# Patient Record
Sex: FEMALE | Race: WHITE | Employment: OTHER | URBAN - METROPOLITAN AREA
[De-identification: names, ages, dates, MRNs, and addresses within clinical notes are randomized per-mention and may not be internally consistent; named-entity substitution may affect disease eponyms.]

---

## 2017-07-10 ENCOUNTER — HOSPITAL ENCOUNTER (EMERGENCY)
Age: 47
Discharge: HOME | End: 2017-07-10
Attending: EMERGENCY MEDICINE
Payer: MEDICARE

## 2017-07-10 VITALS
TEMPERATURE: 98 F | HEIGHT: 67 IN | BODY MASS INDEX: 32.87 KG/M2 | WEIGHT: 209.44 LBS | DIASTOLIC BLOOD PRESSURE: 80 MMHG | OXYGEN SATURATION: 97 % | HEART RATE: 69 BPM | SYSTOLIC BLOOD PRESSURE: 143 MMHG | RESPIRATION RATE: 16 BRPM

## 2017-07-10 DIAGNOSIS — R51.9 ACUTE NONINTRACTABLE HEADACHE, UNSPECIFIED HEADACHE TYPE: Primary | ICD-10-CM

## 2017-07-10 PROCEDURE — 96375 TX/PRO/DX INJ NEW DRUG ADDON: CPT

## 2017-07-10 PROCEDURE — 96376 TX/PRO/DX INJ SAME DRUG ADON: CPT

## 2017-07-10 PROCEDURE — 74011250636 HC RX REV CODE- 250/636: Performed by: EMERGENCY MEDICINE

## 2017-07-10 PROCEDURE — 96374 THER/PROPH/DIAG INJ IV PUSH: CPT

## 2017-07-10 PROCEDURE — 96361 HYDRATE IV INFUSION ADD-ON: CPT

## 2017-07-10 PROCEDURE — 99284 EMERGENCY DEPT VISIT MOD MDM: CPT

## 2017-07-10 RX ORDER — FLUTICASONE PROPIONATE 50 MCG
2 SPRAY, SUSPENSION (ML) NASAL
Status: DISCONTINUED | OUTPATIENT
Start: 2017-07-10 | End: 2017-07-10 | Stop reason: RX

## 2017-07-10 RX ORDER — DEXTROMETHORPHAN HYDROBROMIDE, GUAIFENESIN 5; 100 MG/5ML; MG/5ML
1300 LIQUID ORAL
COMMUNITY
End: 2018-09-22

## 2017-07-10 RX ORDER — PROCHLORPERAZINE EDISYLATE 5 MG/ML
10 INJECTION INTRAMUSCULAR; INTRAVENOUS
Status: COMPLETED | OUTPATIENT
Start: 2017-07-10 | End: 2017-07-10

## 2017-07-10 RX ORDER — BISMUTH SUBSALICYLATE 262 MG
1 TABLET,CHEWABLE ORAL DAILY
COMMUNITY
End: 2018-09-22

## 2017-07-10 RX ORDER — DEXAMETHASONE SODIUM PHOSPHATE 10 MG/ML
10 INJECTION INTRAMUSCULAR; INTRAVENOUS
Status: COMPLETED | OUTPATIENT
Start: 2017-07-10 | End: 2017-07-10

## 2017-07-10 RX ORDER — DIPHENHYDRAMINE HYDROCHLORIDE 50 MG/ML
50 INJECTION, SOLUTION INTRAMUSCULAR; INTRAVENOUS
Status: COMPLETED | OUTPATIENT
Start: 2017-07-10 | End: 2017-07-10

## 2017-07-10 RX ORDER — ARIPIPRAZOLE 5 MG/1
10 TABLET ORAL DAILY
COMMUNITY

## 2017-07-10 RX ORDER — KETOROLAC TROMETHAMINE 30 MG/ML
30 INJECTION, SOLUTION INTRAMUSCULAR; INTRAVENOUS
Status: COMPLETED | OUTPATIENT
Start: 2017-07-10 | End: 2017-07-10

## 2017-07-10 RX ORDER — MINERAL OIL
180 ENEMA (ML) RECTAL DAILY
Qty: 14 TAB | Refills: 0 | Status: SHIPPED | OUTPATIENT
Start: 2017-07-10 | End: 2017-07-24

## 2017-07-10 RX ORDER — DIPHENHYDRAMINE HYDROCHLORIDE 50 MG/ML
25 INJECTION, SOLUTION INTRAMUSCULAR; INTRAVENOUS
Status: COMPLETED | OUTPATIENT
Start: 2017-07-10 | End: 2017-07-10

## 2017-07-10 RX ORDER — LANOLIN ALCOHOL/MO/W.PET/CERES
2000 CREAM (GRAM) TOPICAL DAILY
COMMUNITY
End: 2018-09-22

## 2017-07-10 RX ORDER — FLUTICASONE PROPIONATE 50 MCG
2 SPRAY, SUSPENSION (ML) NASAL DAILY
Qty: 1 BOTTLE | Refills: 0 | Status: SHIPPED | OUTPATIENT
Start: 2017-07-10 | End: 2017-10-31

## 2017-07-10 RX ADMIN — DIPHENHYDRAMINE HYDROCHLORIDE 50 MG: 50 INJECTION, SOLUTION INTRAMUSCULAR; INTRAVENOUS at 10:39

## 2017-07-10 RX ADMIN — SODIUM CHLORIDE 1000 ML: 900 INJECTION, SOLUTION INTRAVENOUS at 10:40

## 2017-07-10 RX ADMIN — DIPHENHYDRAMINE HYDROCHLORIDE 25 MG: 50 INJECTION, SOLUTION INTRAMUSCULAR; INTRAVENOUS at 11:55

## 2017-07-10 RX ADMIN — DEXAMETHASONE SODIUM PHOSPHATE 10 MG: 10 INJECTION, SOLUTION INTRAMUSCULAR; INTRAVENOUS at 10:45

## 2017-07-10 RX ADMIN — PROCHLORPERAZINE EDISYLATE 10 MG: 5 INJECTION INTRAMUSCULAR; INTRAVENOUS at 11:53

## 2017-07-10 RX ADMIN — KETOROLAC TROMETHAMINE 30 MG: 30 INJECTION, SOLUTION INTRAMUSCULAR at 10:43

## 2017-07-10 RX ADMIN — SODIUM CHLORIDE 1000 ML: 900 INJECTION, SOLUTION INTRAVENOUS at 11:52

## 2017-07-10 RX ADMIN — PROCHLORPERAZINE EDISYLATE 10 MG: 5 INJECTION INTRAMUSCULAR; INTRAVENOUS at 10:38

## 2017-07-10 NOTE — ED NOTES
Pt given discharge instructions by Dr Joseline Montana she verbalizes an understanding, pt stable at time of discharge ambulates to lobby with friend

## 2017-07-10 NOTE — ED TRIAGE NOTES
Pt ambulates to treatment area with friend she states that for the past 3 days she has had a sinus headache that is now causing her a lot of stress and making her headache worse. She has had N/V with the headache but no dizziness. She continues to have sinus congestion on the right side as well.   She has been taking Tylenol and took Nyquil last night but it did not help

## 2017-07-10 NOTE — ED NOTES
Pt repositioned with lights dimmed and warm blanket provided for comfort. Pt states that she is feeling less stressed with medication but her pain is still there.   IV fluids infusing pt's friend at the bedside

## 2017-07-10 NOTE — ED PROVIDER NOTES
Patient is a 48 y.o. female presenting with headaches. The history is provided by the patient. Headache    This is a recurrent problem. Episode onset: 3 days. The problem occurs constantly. The problem has been gradually worsening. The headache is aggravated by photophobia (sinus congestion). The pain is located in the right unilateral region. The quality of the pain is described as throbbing. The pain is at a severity of 10/10. Pain severity now: similiar to prior headaches. Associated symptoms include nausea and vomiting. Pertinent negatives include no fever, no chest pressure, no shortness of breath, no weakness, no tingling, no dizziness and no visual change. She has tried nothing for the symptoms. Past Medical History:   Diagnosis Date    Asthma     Bronchitis     Fibromyalgia     Seasonal allergic reaction        Past Surgical History:   Procedure Laterality Date    HX APPENDECTOMY      HX GASTRIC BYPASS      HX GYN      HX ORTHOPAEDIC      lumbar fusion    HX TUBAL LIGATION           History reviewed. No pertinent family history. Social History     Social History    Marital status:      Spouse name: N/A    Number of children: N/A    Years of education: N/A     Occupational History    Not on file. Social History Main Topics    Smoking status: Former Smoker    Smokeless tobacco: Never Used    Alcohol use No    Drug use: No    Sexual activity: Not on file     Other Topics Concern    Not on file     Social History Narrative         ALLERGIES: Lortab [hydrocodone-acetaminophen] and Percocet [oxycodone-acetaminophen]    Review of Systems   Constitutional: Negative for chills and fever. HENT: Negative for ear pain and sore throat. Eyes: Negative for pain. Respiratory: Negative for chest tightness and shortness of breath. Cardiovascular: Negative for chest pain and leg swelling. Gastrointestinal: Positive for nausea and vomiting. Negative for abdominal pain. Genitourinary: Negative for dysuria and flank pain. Musculoskeletal: Negative for back pain. Skin: Negative for rash. Neurological: Positive for headaches. Negative for dizziness, tingling and weakness. All other systems reviewed and are negative. Vitals:    07/10/17 1006   BP: 151/83   Pulse: 80   Resp: 16   Temp: 98.2 °F (36.8 °C)   SpO2: 97%   Weight: 95 kg (209 lb 7 oz)   Height: 5' 6.75\" (1.695 m)            Physical Exam   Constitutional: She is oriented to person, place, and time. She appears well-developed and well-nourished. HENT:   Head: Normocephalic and atraumatic. Mouth/Throat: Oropharynx is clear and moist.   Eyes: Conjunctivae and EOM are normal. Pupils are equal, round, and reactive to light. No scleral icterus. Neck: Neck supple. No tracheal deviation present. Cardiovascular: Normal rate, regular rhythm, normal heart sounds and intact distal pulses. Pulmonary/Chest: Effort normal and breath sounds normal. No respiratory distress. Abdominal: Soft. She exhibits no distension. There is no tenderness. There is no rebound and no guarding. Genitourinary:   Genitourinary Comments: deferred   Musculoskeletal: She exhibits no edema. Neurological: She is alert and oriented to person, place, and time. No cranial nerve deficit. She exhibits normal muscle tone. Coordination normal.   Motor and sensation intact throughout. No pronator drift, negative Romberg. Skin: Skin is warm and dry. Psychiatric: She has a normal mood and affect. Nursing note and vitals reviewed. MDM  Number of Diagnoses or Management Options  Diagnosis management comments: 48year old female with h/o headaches and fibromyalgia presents with similar HA similar to prior. Out of her allegra and flonase. She thinks allergies precipitated this. Plan to treat with migraine cocktail and re-assess.     ED Course     12:34 PM Patient headache resolved    Procedures      12:34 PM  The patient has been reevaluated. The patient is ready for discharge. The patient's signs, symptoms, diagnosis, and discharge instructions have been discussed and the patient/ family has conveyed their understanding. The patient is to follow up as recommended or return to the ED should their symptoms worsen. Plan has been discussed and the patient is in agreement. LABORATORY TESTS:  No results found for this or any previous visit (from the past 12 hour(s)). IMAGING RESULTS:  No orders to display     No results found. MEDICATIONS GIVEN:  Medications   sodium chloride 0.9 % bolus infusion 1,000 mL (1,000 mL IntraVENous New Bag 7/10/17 1152)   sodium chloride 0.9 % bolus infusion 1,000 mL (0 mL IntraVENous IV Completed 7/10/17 1126)   dexamethasone (PF) (DECADRON) injection 10 mg (10 mg IntraVENous Given 7/10/17 1045)   diphenhydrAMINE (BENADRYL) injection 50 mg (50 mg IntraVENous Given 7/10/17 1039)   ketorolac (TORADOL) injection 30 mg (30 mg IntraVENous Given 7/10/17 1043)   prochlorperazine (COMPAZINE) injection 10 mg (10 mg IntraVENous Given 7/10/17 1038)   prochlorperazine (COMPAZINE) injection 10 mg (10 mg IntraVENous Given 7/10/17 1153)   diphenhydrAMINE (BENADRYL) injection 25 mg (25 mg IntraVENous Given 7/10/17 1155)       IMPRESSION:  1. Acute nonintractable headache, unspecified headache type        PLAN:  1. Current Discharge Medication List      START taking these medications    Details   fluticasone (FLONASE) 50 mcg/actuation nasal spray 2 Sprays by Both Nostrils route daily. Qty: 1 Bottle, Refills: 0      fexofenadine (ALLEGRA ALLERGY) 180 mg tablet Take 1 Tab by mouth daily for 14 days. Qty: 14 Tab, Refills: 0           2.    Follow-up Information     Follow up With Details Comments 130 Adin Capellan MD Call  [facility] [address] [facility] [address] [phone]      [facility] EMERGENCY DEPT   [place] [address]  [address] [id number] [phone]    [address] [place] [address] DEPT  If symptoms worsen [place] [address]  [phone]            Return to ED for new or worsening symptoms       Emperatriz Bruce.  Curry Burgess MD

## 2017-07-10 NOTE — DISCHARGE INSTRUCTIONS
Headache: Care Instructions  Your Care Instructions    Headaches have many possible causes. Most headaches aren't a sign of a more serious problem, and they will get better on their own. Home treatment may help you feel better faster. The doctor has checked you carefully, but problems can develop later. If you notice any problems or new symptoms, get medical treatment right away. Follow-up care is a key part of your treatment and safety. Be sure to make and go to all appointments, and call your doctor if you are having problems. It's also a good idea to know your test results and keep a list of the medicines you take. How can you care for yourself at home? · Do not drive if you have taken a prescription pain medicine. · Rest in a quiet, dark room until your headache is gone. Close your eyes and try to relax or go to sleep. Don't watch TV or read. · Put a cold, moist cloth or cold pack on the painful area for 10 to 20 minutes at a time. Put a thin cloth between the cold pack and your skin. · Use a warm, moist towel or a heating pad set on low to relax tight shoulder and neck muscles. · Have someone gently massage your neck and shoulders. · Take pain medicines exactly as directed. ¨ If the doctor gave you a prescription medicine for pain, take it as prescribed. ¨ If you are not taking a prescription pain medicine, ask your doctor if you can take an over-the-counter medicine. · Be careful not to take pain medicine more often than the instructions allow, because you may get worse or more frequent headaches when the medicine wears off. · Do not ignore new symptoms that occur with a headache, such as a fever, weakness or numbness, vision changes, or confusion. These may be signs of a more serious problem. To prevent headaches  · Keep a headache diary so you can figure out what triggers your headaches. Avoiding triggers may help you prevent headaches.  Record when each headache began, how long it lasted, and what the pain was like (throbbing, aching, stabbing, or dull). Write down any other symptoms you had with the headache, such as nausea, flashing lights or dark spots, or sensitivity to bright light or loud noise. Note if the headache occurred near your period. List anything that might have triggered the headache, such as certain foods (chocolate, cheese, wine) or odors, smoke, bright light, stress, or lack of sleep. · Find healthy ways to deal with stress. Headaches are most common during or right after stressful times. Take time to relax before and after you do something that has caused a headache in the past.  · Try to keep your muscles relaxed by keeping good posture. Check your jaw, face, neck, and shoulder muscles for tension, and try relaxing them. When sitting at a desk, change positions often, and stretch for 30 seconds each hour. · Get plenty of sleep and exercise. · Eat regularly and well. Long periods without food can trigger a headache. · Treat yourself to a massage. Some people find that regular massages are very helpful in relieving tension. · Limit caffeine by not drinking too much coffee, tea, or soda. But don't quit caffeine suddenly, because that can also give you headaches. · Reduce eyestrain from computers by blinking frequently and looking away from the computer screen every so often. Make sure you have proper eyewear and that your monitor is set up properly, about an arm's length away. · Seek help if you have depression or anxiety. Your headaches may be linked to these conditions. Treatment can both prevent headaches and help with symptoms of anxiety or depression. When should you call for help? Call 911 anytime you think you may need emergency care. For example, call if:  · You have signs of a stroke. These may include:  ¨ Sudden numbness, paralysis, or weakness in your face, arm, or leg, especially on only one side of your body. ¨ Sudden vision changes.   ¨ Sudden trouble speaking. ¨ Sudden confusion or trouble understanding simple statements. ¨ Sudden problems with walking or balance. ¨ A sudden, severe headache that is different from past headaches. Call your doctor now or seek immediate medical care if:  · You have a new or worse headache. · Your headache gets much worse. Where can you learn more? Go to http://sabi-dakota.info/. Enter M271 in the search box to learn more about \"Headache: Care Instructions. \"  Current as of: October 14, 2016  Content Version: 11.3  © 8009-1810 [employer]. Care instructions adapted under license by [employer] (which disclaims liability or warranty for this information). If you have questions about a medical condition or this instruction, always ask your healthcare professional. [address] any warranty or liability for your use of this information. We hope that we have addressed all of your medical concerns. The examination and treatment you received in the Emergency Department were for an emergent problem and were not intended as complete care. It is important that you follow up with your healthcare provider(s) for ongoing care. If your symptoms worsen or do not improve as expected, and you are unable to reach your usual health care provider(s), you should return to the Emergency Department. Today's healthcare is undergoing tremendous change, and patient satisfaction surveys are one of the many tools to assess the quality of medical care. You may receive a survey from the [employer] regarding your experience in the Emergency Department. I hope that your experience has been completely positive, particularly the medical care that I provided. As such, please participate in the survey; anything less than excellent does not meet my expectations or intentions.         [address] [facility] and [address] [place] [address] participate in nationally recognized quality of care measures. If your blood pressure is greater than 120/80, as reported below, we urge that you seek medical care to address the potential of high blood pressure, commonly known as hypertension. Hypertension can be hereditary or can be caused by certain medical conditions, pain, stress, or \"white coat syndrome. \"       Please make an appointment with your health care provider(s) for follow up of your Emergency Department visit. VITALS:   Patient Vitals for the past 8 hrs:   Temp Pulse Resp BP SpO2   07/10/17 1229 - - - 142/75 97 %   07/10/17 1200 - - - 137/75 97 %   07/10/17 1100 - - - 148/81 98 %   07/10/17 1037 - - - 136/78 -   07/10/17 1006 98.2 °F (36.8 °C) 80 16 151/83 97 %          Thank you for allowing us to provide you with medical care today. We realize that you have many choices for your emergency care needs. Please choose us in the future for any continued health care needs. Lauren Montana, [address] Brianna Pak: [phone]            No results found for this or any previous visit (from the past 24 hour(s)). No results found.

## 2017-10-02 ENCOUNTER — HOSPITAL ENCOUNTER (EMERGENCY)
Age: 47
Discharge: HOME | End: 2017-10-02
Attending: EMERGENCY MEDICINE
Payer: MEDICARE

## 2017-10-02 VITALS
RESPIRATION RATE: 16 BRPM | WEIGHT: 206.57 LBS | HEART RATE: 78 BPM | OXYGEN SATURATION: 96 % | TEMPERATURE: 98.2 F | SYSTOLIC BLOOD PRESSURE: 137 MMHG | BODY MASS INDEX: 32.6 KG/M2 | DIASTOLIC BLOOD PRESSURE: 89 MMHG

## 2017-10-02 DIAGNOSIS — R51.9 ACUTE NONINTRACTABLE HEADACHE, UNSPECIFIED HEADACHE TYPE: Primary | ICD-10-CM

## 2017-10-02 PROCEDURE — 74011000258 HC RX REV CODE- 258: Performed by: EMERGENCY MEDICINE

## 2017-10-02 PROCEDURE — 96375 TX/PRO/DX INJ NEW DRUG ADDON: CPT

## 2017-10-02 PROCEDURE — 74011250636 HC RX REV CODE- 250/636: Performed by: EMERGENCY MEDICINE

## 2017-10-02 PROCEDURE — 74011000250 HC RX REV CODE- 250: Performed by: EMERGENCY MEDICINE

## 2017-10-02 PROCEDURE — 96365 THER/PROPH/DIAG IV INF INIT: CPT

## 2017-10-02 PROCEDURE — 99283 EMERGENCY DEPT VISIT LOW MDM: CPT

## 2017-10-02 PROCEDURE — 96361 HYDRATE IV INFUSION ADD-ON: CPT

## 2017-10-02 RX ORDER — PROCHLORPERAZINE EDISYLATE 5 MG/ML
10 INJECTION INTRAMUSCULAR; INTRAVENOUS
Status: DISCONTINUED | OUTPATIENT
Start: 2017-10-02 | End: 2017-10-02 | Stop reason: HOSPADM

## 2017-10-02 RX ORDER — KETOROLAC TROMETHAMINE 30 MG/ML
30 INJECTION, SOLUTION INTRAMUSCULAR; INTRAVENOUS
Status: COMPLETED | OUTPATIENT
Start: 2017-10-02 | End: 2017-10-02

## 2017-10-02 RX ORDER — ONDANSETRON 8 MG/1
8 TABLET, ORALLY DISINTEGRATING ORAL
Qty: 12 TAB | Refills: 0 | Status: SHIPPED | OUTPATIENT
Start: 2017-10-02 | End: 2017-10-09

## 2017-10-02 RX ORDER — TRIAMCINOLONE ACETONIDE 55 UG/1
2 SPRAY, METERED NASAL DAILY
COMMUNITY
End: 2018-09-22

## 2017-10-02 RX ORDER — DIPHENHYDRAMINE HYDROCHLORIDE 50 MG/ML
25 INJECTION, SOLUTION INTRAMUSCULAR; INTRAVENOUS
Status: COMPLETED | OUTPATIENT
Start: 2017-10-02 | End: 2017-10-02

## 2017-10-02 RX ORDER — DEXAMETHASONE SODIUM PHOSPHATE 10 MG/ML
10 INJECTION INTRAMUSCULAR; INTRAVENOUS
Status: COMPLETED | OUTPATIENT
Start: 2017-10-02 | End: 2017-10-02

## 2017-10-02 RX ADMIN — SODIUM CHLORIDE 1000 ML: 900 INJECTION, SOLUTION INTRAVENOUS at 01:29

## 2017-10-02 RX ADMIN — DEXAMETHASONE SODIUM PHOSPHATE 10 MG: 10 INJECTION, SOLUTION INTRAMUSCULAR; INTRAVENOUS at 02:25

## 2017-10-02 RX ADMIN — KETOROLAC TROMETHAMINE 30 MG: 30 INJECTION, SOLUTION INTRAMUSCULAR at 01:28

## 2017-10-02 RX ADMIN — VALPROATE SODIUM 500 MG: 100 INJECTION INTRAVENOUS at 02:23

## 2017-10-02 RX ADMIN — DIPHENHYDRAMINE HYDROCHLORIDE 25 MG: 50 INJECTION, SOLUTION INTRAMUSCULAR; INTRAVENOUS at 01:28

## 2017-10-02 RX ADMIN — PROCHLORPERAZINE EDISYLATE 10 MG: 5 INJECTION INTRAMUSCULAR; INTRAVENOUS at 01:28

## 2017-10-02 NOTE — ED PROVIDER NOTES
Patient is a 48 y.o. female presenting with migraines and headaches. The history is provided by the patient. Migraine    Associated symptoms include near-syncope, weakness, dizziness, nausea and vomiting. Pertinent negatives include no anorexia, no fever, no malaise/fatigue, no chest pressure, no orthopnea, no palpitations, no syncope, no shortness of breath, no tingling and no visual change. Headache    This is a recurrent problem. The current episode started 12 to 24 hours ago (She awoke the the headache this AM.). The problem occurs constantly. The problem has been gradually worsening. Associated with: She has hx headaches and feels they are brought on by allergies. She notes she is only able to tolerate Nasal spray. She is not on any antihistamines as they don't help. She hasn't been able to tolerate allergy shots since diagnosis of fibromyalgia. The pain is located in the right unilateral region. The quality of the pain is described as sharp. The pain is at a severity of 10/10. The pain is severe. Associated symptoms include near-syncope, weakness, dizziness, nausea and vomiting. Pertinent negatives include no anorexia, no fever, no malaise/fatigue, no chest pressure, no orthopnea, no palpitations, no syncope, no shortness of breath, no tingling and no visual change. Treatments tried: Nasonex. The treatment provided no relief. She does not currently have a neurologist.     Past Medical History:   Diagnosis Date    Asthma     Bronchitis     Fibromyalgia     Seasonal allergic reaction        Past Surgical History:   Procedure Laterality Date    HX APPENDECTOMY      HX GASTRIC BYPASS      HX GYN      HX ORTHOPAEDIC      lumbar fusion    HX TUBAL LIGATION           History reviewed. No pertinent family history. Social History     Social History    Marital status:      Spouse name: N/A    Number of children: N/A    Years of education: N/A     Occupational History    Not on file. Social History Main Topics    Smoking status: Former Smoker    Smokeless tobacco: Never Used    Alcohol use No    Drug use: No    Sexual activity: Not on file     Other Topics Concern    Not on file     Social History Narrative         ALLERGIES: Lortab [hydrocodone-acetaminophen] and Percocet [oxycodone-acetaminophen]    Review of Systems   Constitutional: Negative for appetite change, fever and malaise/fatigue. HENT: Positive for congestion. Negative for nosebleeds and sore throat. Eyes: Negative for discharge. Respiratory: Negative for cough and shortness of breath. Cardiovascular: Positive for near-syncope. Negative for chest pain, palpitations, orthopnea and syncope. Gastrointestinal: Positive for nausea and vomiting. Negative for abdominal pain, anorexia and diarrhea. Genitourinary: Negative for dysuria. Musculoskeletal: Negative. Skin: Negative for rash. Neurological: Positive for dizziness, weakness and headaches. Negative for tingling. Hematological: Negative for adenopathy. Psychiatric/Behavioral: Negative. All other systems reviewed and are negative. Vitals:    10/02/17 0046 10/02/17 0328   BP: 167/84 137/89   Pulse: 87 78   Resp: 17 16   Temp: 98.2 °F (36.8 °C)    SpO2: 98% 96%   Weight: 93.7 kg (206 lb 9.1 oz)             Physical Exam   Constitutional: She is oriented to person, place, and time. She appears well-developed and well-nourished. HENT:   Head: Normocephalic and atraumatic. Mouth/Throat: Oropharynx is clear and moist.   Eyes: Conjunctivae and EOM are normal. Pupils are equal, round, and reactive to light. Neck: Normal range of motion. Neck supple. Cardiovascular: Normal rate, regular rhythm and normal heart sounds. Pulmonary/Chest: Effort normal and breath sounds normal.   Abdominal: Soft. Bowel sounds are normal. There is no tenderness. Musculoskeletal: Normal range of motion. She exhibits no edema or tenderness.    Neurological: She is alert and oriented to person, place, and time. No cranial nerve deficit. She exhibits normal muscle tone. Coordination normal.   Skin: Skin is warm and dry. Psychiatric: She has a normal mood and affect. Her behavior is normal.   Nursing note and vitals reviewed. [facility]  ED Course       Procedures    A/P:  1. Headache - resolved. Advised to f/u with ENT, allergy, and neurology. 2. N/V - resolved. Zofran prn. Patient's results have been reviewed with them. Patient and/or family have verbally conveyed their understanding and agreement of the patient's signs, symptoms, diagnosis, treatment and prognosis and additionally agree to follow up as recommended or return to the Emergency Room should their condition change prior to follow-up. Discharge instructions have also been provided to the patient with some educational information regarding their diagnosis as well a list of reasons why they would want to return to the ER prior to their follow-up appointment should their condition change.

## 2017-10-02 NOTE — ED NOTES
Pt was discharged and given instructions by MD. Pt verbalized good understanding of all discharge instructions,prescriptions and F/U care. All questions answered. Pt in stable condition on discharge. Pt ambulatory off unit with  as ride.

## 2017-10-02 NOTE — DISCHARGE INSTRUCTIONS

## 2017-10-31 ENCOUNTER — APPOINTMENT (OUTPATIENT)
Dept: CT IMAGING | Age: 47
End: 2017-10-31
Attending: NURSE PRACTITIONER
Payer: MEDICARE

## 2017-10-31 ENCOUNTER — HOSPITAL ENCOUNTER (EMERGENCY)
Age: 47
Discharge: HOME | End: 2017-10-31
Attending: EMERGENCY MEDICINE
Payer: MEDICARE

## 2017-10-31 VITALS
OXYGEN SATURATION: 98 % | HEART RATE: 81 BPM | WEIGHT: 216.05 LBS | SYSTOLIC BLOOD PRESSURE: 152 MMHG | RESPIRATION RATE: 15 BRPM | TEMPERATURE: 98.1 F | BODY MASS INDEX: 33.91 KG/M2 | DIASTOLIC BLOOD PRESSURE: 84 MMHG | HEIGHT: 67 IN

## 2017-10-31 DIAGNOSIS — R51.9 NONINTRACTABLE HEADACHE, UNSPECIFIED CHRONICITY PATTERN, UNSPECIFIED HEADACHE TYPE: Primary | ICD-10-CM

## 2017-10-31 PROCEDURE — 99283 EMERGENCY DEPT VISIT LOW MDM: CPT

## 2017-10-31 PROCEDURE — 74011000258 HC RX REV CODE- 258: Performed by: NURSE PRACTITIONER

## 2017-10-31 PROCEDURE — 96361 HYDRATE IV INFUSION ADD-ON: CPT

## 2017-10-31 PROCEDURE — 70450 CT HEAD/BRAIN W/O DYE: CPT

## 2017-10-31 PROCEDURE — 74011000250 HC RX REV CODE- 250: Performed by: NURSE PRACTITIONER

## 2017-10-31 PROCEDURE — 74011250636 HC RX REV CODE- 250/636: Performed by: NURSE PRACTITIONER

## 2017-10-31 PROCEDURE — 96375 TX/PRO/DX INJ NEW DRUG ADDON: CPT

## 2017-10-31 PROCEDURE — 96374 THER/PROPH/DIAG INJ IV PUSH: CPT

## 2017-10-31 RX ORDER — DEXAMETHASONE SODIUM PHOSPHATE 10 MG/ML
10 INJECTION INTRAMUSCULAR; INTRAVENOUS
Status: COMPLETED | OUTPATIENT
Start: 2017-10-31 | End: 2017-10-31

## 2017-10-31 RX ORDER — BUTALBITAL, ACETAMINOPHEN AND CAFFEINE 300; 40; 50 MG/1; MG/1; MG/1
1 CAPSULE ORAL
Qty: 20 CAP | Refills: 0 | Status: SHIPPED | OUTPATIENT
Start: 2017-10-31 | End: 2018-09-22

## 2017-10-31 RX ORDER — DIPHENHYDRAMINE HYDROCHLORIDE 50 MG/ML
25 INJECTION, SOLUTION INTRAMUSCULAR; INTRAVENOUS
Status: COMPLETED | OUTPATIENT
Start: 2017-10-31 | End: 2017-10-31

## 2017-10-31 RX ORDER — METOCLOPRAMIDE HYDROCHLORIDE 5 MG/ML
10 INJECTION INTRAMUSCULAR; INTRAVENOUS
Status: COMPLETED | OUTPATIENT
Start: 2017-10-31 | End: 2017-10-31

## 2017-10-31 RX ADMIN — VALPROATE SODIUM 500 MG: 100 INJECTION INTRAVENOUS at 20:33

## 2017-10-31 RX ADMIN — DEXAMETHASONE SODIUM PHOSPHATE 10 MG: 10 INJECTION, SOLUTION INTRAMUSCULAR; INTRAVENOUS at 20:27

## 2017-10-31 RX ADMIN — DIPHENHYDRAMINE HYDROCHLORIDE 25 MG: 50 INJECTION, SOLUTION INTRAMUSCULAR; INTRAVENOUS at 20:21

## 2017-10-31 RX ADMIN — SODIUM CHLORIDE 1000 ML: 900 INJECTION, SOLUTION INTRAVENOUS at 20:19

## 2017-10-31 RX ADMIN — METOCLOPRAMIDE 10 MG: 5 INJECTION, SOLUTION INTRAMUSCULAR; INTRAVENOUS at 20:30

## 2017-10-31 NOTE — ED PROVIDER NOTES
HPI Comments: Cas Milton is a 47 yo WF presenting to ED via car with c/o migraines x 24 hours. Pt has a hx of migrianes and has been recently dx with menopausal migraines. Last night the patient started with a headache that comes around that same time of her menstrual period. The patient states that normally she takes her medication and this keeps her pain to just below a headache. Today, however it has developed into a migraine. She states that she took her last toradol at 0800 this am.  The patient states that her pain is normally above her right eye and today it is above her left and right eye but mostly centered behind her left eye.  + nausea and vomiting.  + photophobia. PCP: Kay Archer MD    There were no other complaints, changes, physical findings at this time. Past Medical History:  No date: Asthma  No date: Bronchitis  No date: Fibromyalgia  No date: Seasonal allergic reaction      The history is provided by the patient. No  was used. Past Medical History:   Diagnosis Date    Asthma     Bronchitis     Fibromyalgia     Seasonal allergic reaction        Past Surgical History:   Procedure Laterality Date    HX APPENDECTOMY      HX GASTRIC BYPASS      HX GYN      HX ORTHOPAEDIC      lumbar fusion    HX TUBAL LIGATION           History reviewed. No pertinent family history. Social History     Social History    Marital status:      Spouse name: N/A    Number of children: N/A    Years of education: N/A     Occupational History    Not on file. Social History Main Topics    Smoking status: Former Smoker    Smokeless tobacco: Never Used    Alcohol use No    Drug use: No    Sexual activity: Not on file     Other Topics Concern    Not on file     Social History Narrative         ALLERGIES: Lortab [hydrocodone-acetaminophen] and Percocet [oxycodone-acetaminophen]    Review of Systems   Constitutional: Negative.   Negative for chills, diaphoresis and fever. HENT: Negative. Negative for congestion, rhinorrhea and trouble swallowing. Eyes: Negative. Respiratory: Negative. Negative for shortness of breath. Cardiovascular: Negative. Gastrointestinal: Positive for nausea and vomiting. Negative for abdominal pain. Endocrine: Negative. Genitourinary: Positive for menstrual problem. Musculoskeletal: Negative for arthralgias, myalgias, neck pain and neck stiffness. Skin: Negative. Negative for rash. Allergic/Immunologic: Negative. Neurological: Positive for headaches. Negative for dizziness, syncope and weakness. Hematological: Negative. Psychiatric/Behavioral: Negative. Vitals:    10/31/17 1908   BP: (!) 175/104   Pulse: 91   Resp: 20   Temp: 98.1 °F (36.7 °C)   SpO2: 96%   Weight: 98 kg (216 lb 0.8 oz)   Height: 5' 6.75\" (1.695 m)            Physical Exam   Constitutional: She is oriented to person, place, and time. Vital signs are normal. She appears well-developed and well-nourished. Non-toxic appearance. She does not have a sickly appearance. She does not appear ill. HENT:   Head: Normocephalic and atraumatic. Eyes: Conjunctivae, EOM and lids are normal. Pupils are equal, round, and reactive to light. Neck: Trachea normal, normal range of motion and full passive range of motion without pain. Neck supple. Cardiovascular: Normal rate, regular rhythm, normal heart sounds and normal pulses. Pulmonary/Chest: Effort normal and breath sounds normal.   Abdominal: Soft. Normal appearance and bowel sounds are normal.   Musculoskeletal: Normal range of motion. Neurological: She is alert and oriented to person, place, and time. She has normal strength. Coordination and gait normal. GCS eye subscore is 4. GCS verbal subscore is 5. GCS motor subscore is 6. CN II-XII intact  Tongue protrudes midline  Gait normal   Skin: Skin is warm, dry and intact. Psychiatric: She has a normal mood and affect.  Her speech is normal and behavior is normal. Judgment and thought content normal. Cognition and memory are normal.   Nursing note and vitals reviewed. MDM  Number of Diagnoses or Management Options  Nonintractable headache, unspecified chronicity pattern, unspecified headache type: new and requires workup     Amount and/or Complexity of Data Reviewed  Clinical lab tests: ordered  Tests in the radiology section of CPT®: ordered  Discuss the patient with other providers: yes Gilles Anderson)    Risk of Complications, Morbidity, and/or Mortality  Presenting problems: moderate  Diagnostic procedures: moderate  Management options: low    Patient Progress  Patient progress: improved    ED Course       Procedures    8:13 PM  I have just reevaluated the patient. I have reviewed Her vital signs and determined there is currently no worsening in their condition or physical exam.  Results have been reviewed with them and their questions have been answered. We will continue to review further results as they come available. The patient is back from CT and her CT has been read as normal.  They have just gotten the IV so her medications will be infusing over the next hour. Shaji Madrigal Pagé FNP-BC    LABORATORY TESTS:  No results found for this or any previous visit (from the past 12 hour(s)). IMAGING RESULTS:    CT Results  (Last 48 hours)               10/31/17 2008  CT HEAD WO CONT Final result    Impression:  IMPRESSION: No acute intracranial process seen       Narrative:  EXAM:  CT HEAD WO CONT       INDICATION:   Migraines with new focal signs       COMPARISON: None. CONTRAST:  None. TECHNIQUE: Unenhanced CT of the head was performed using 5 mm images. Brain and   bone windows were generated. CT dose reduction was achieved through use of a   standardized protocol tailored for this examination and automatic exposure   control for dose modulation.   Adaptive statistical iterative reconstruction   (ASIR) was utilized. FINDINGS:   The ventricles and sulci are normal in size, shape and configuration and   midline. There is no significant white matter disease. There is no intracranial   hemorrhage, extra-axial collection, mass, mass effect or midline shift. The   basilar cisterns are open. No acute infarct is identified. The bone windows   demonstrate no abnormalities. The visualized portions of the paranasal sinuses   and mastoid air cells are clear. PFT Results  (Last 48 hours)    None        Echo Results  (Last 48 hours)    None        CXR Results  (Last 48 hours)    None        VENOUS DOPPLER results  (Last 48 hours)    None            MEDICATIONS GIVEN:  Medications   sodium chloride 0.9 % bolus infusion 1,000 mL (1,000 mL IntraVENous New Bag 10/31/17 2019)   metoclopramide HCl (REGLAN) injection 10 mg (not administered)   valproate (DEPACON) 500 mg in 0.9% sodium chloride 50 mL IVPB (not administered)   dexamethasone (PF) (DECADRON) injection 10 mg (10 mg IntraVENous Given 10/31/17 2027)   diphenhydrAMINE (BENADRYL) injection 25 mg (25 mg IntraVENous Given 10/31/17 2021)       IMPRESSION:  1. Nonintractable headache, unspecified chronicity pattern, unspecified headache type        PLAN:  1. Fioricet as needed  2. F/U with PCP and Neurology  Return to ED if worse    Discharge Note  8:32 PM  The patient is ready for discharge. The patient's signs, symptoms, diagnosis, and discharge instructions have been discussed and the patient has conveyed their understanding. The patient is to follow up as recommended or return to the ER should their symptoms worsen. Plan has been discussed and the patient is in agreement. Kathleen Pro Pagé FNP-BC.

## 2017-10-31 NOTE — ED NOTES
3 unsuccessful PIV attempts; 2 on the right arm and 1 on the left arm. Informed charge nurse. \"I usually need an ultrasound machine at the other hospital before procedures, to get my lines started. \"

## 2017-10-31 NOTE — ED TRIAGE NOTES
\"I have a menopausal migraine on the left side of my head. I took my last Toradol today and am now taking birth control pills. \"

## 2017-11-01 NOTE — DISCHARGE INSTRUCTIONS
We hope that we have addressed all of your medical concerns. The examination and treatment you received in the Emergency Department were for an emergent problem and were not intended as complete care. It is important that you follow up with your healthcare provider(s) for ongoing care. If your symptoms worsen or do not improve as expected, and you are unable to reach your usual health care provider(s), you should return to the Emergency Department. Today's healthcare is undergoing tremendous change, and patient satisfaction surveys are one of the many tools to assess the quality of medical care. You may receive a survey from the [employer] organization regarding your experience in the Emergency Department. I hope that your experience has been completely positive, particularly the medical care that I provided. As such, please participate in the survey; anything less than excellent does not meet my expectations or intentions. [facility] and [facility] participate in nationally recognized quality of care measures. If your blood pressure is greater than 120/80, as reported below, we urge that you seek medical care to address the potential of high blood pressure, commonly known as hypertension. Hypertension can be hereditary or can be caused by certain medical conditions, pain, stress, or \"white coat syndrome. \"       Please make an appointment with your health care provider(s) for follow up of your Emergency Department visit. VITALS:   Patient Vitals for the past 8 hrs:   Temp Pulse Resp BP SpO2   10/31/17 1908 98.1 °F (36.7 °C) 91 20 (!) 175/104 96 %          Thank you for allowing us to provide you with medical care today. We realize that you have many choices for your emergency care needs. Please choose us in the future for any continued health care needs. Clarisse Chavarria, [facility]. Office: [phone]            No results found for this or any previous visit (from the past 24 hour(s)). Ct Head Wo Cont    Result Date: 10/31/2017  EXAM:  CT HEAD WO CONT INDICATION:   Migraines with new focal signs COMPARISON: None. CONTRAST:  None. TECHNIQUE: Unenhanced CT of the head was performed using 5 mm images. Brain and bone windows were generated. CT dose reduction was achieved through use of a standardized protocol tailored for this examination and automatic exposure control for dose modulation. Adaptive statistical iterative reconstruction (ASIR) was utilized. FINDINGS: The ventricles and sulci are normal in size, shape and configuration and midline. There is no significant white matter disease. There is no intracranial hemorrhage, extra-axial collection, mass, mass effect or midline shift. The basilar cisterns are open. No acute infarct is identified. The bone windows demonstrate no abnormalities. The visualized portions of the paranasal sinuses and mastoid air cells are clear. IMPRESSION: No acute intracranial process seen               Headache: Care Instructions  Your Care Instructions    Headaches have many possible causes. Most headaches aren't a sign of a more serious problem, and they will get better on their own. Home treatment may help you feel better faster. The doctor has checked you carefully, but problems can develop later. If you notice any problems or new symptoms, get medical treatment right away. Follow-up care is a key part of your treatment and safety. Be sure to make and go to all appointments, and call your doctor if you are having problems. It's also a good idea to know your test results and keep a list of the medicines you take. How can you care for yourself at home? · Do not drive if you have taken a prescription pain medicine. · Rest in a quiet, dark room until your headache is gone. Close your eyes and try to relax or go to sleep.  Don't watch TV or read.  · Put a cold, moist cloth or cold pack on the painful area for 10 to 20 minutes at a time. Put a thin cloth between the cold pack and your skin. · Use a warm, moist towel or a heating pad set on low to relax tight shoulder and neck muscles. · Have someone gently massage your neck and shoulders. · Take pain medicines exactly as directed. ¨ If the doctor gave you a prescription medicine for pain, take it as prescribed. ¨ If you are not taking a prescription pain medicine, ask your doctor if you can take an over-the-counter medicine. · Be careful not to take pain medicine more often than the instructions allow, because you may get worse or more frequent headaches when the medicine wears off. · Do not ignore new symptoms that occur with a headache, such as a fever, weakness or numbness, vision changes, or confusion. These may be signs of a more serious problem. To prevent headaches  · Keep a headache diary so you can figure out what triggers your headaches. Avoiding triggers may help you prevent headaches. Record when each headache began, how long it lasted, and what the pain was like (throbbing, aching, stabbing, or dull). Write down any other symptoms you had with the headache, such as nausea, flashing lights or dark spots, or sensitivity to bright light or loud noise. Note if the headache occurred near your period. List anything that might have triggered the headache, such as certain foods (chocolate, cheese, wine) or odors, smoke, bright light, stress, or lack of sleep. · Find healthy ways to deal with stress. Headaches are most common during or right after stressful times. Take time to relax before and after you do something that has caused a headache in the past.  · Try to keep your muscles relaxed by keeping good posture. Check your jaw, face, neck, and shoulder muscles for tension, and try relaxing them. When sitting at a desk, change positions often, and stretch for 30 seconds each hour.   · Get plenty of sleep and exercise. · Eat regularly and well. Long periods without food can trigger a headache. · Treat yourself to a massage. Some people find that regular massages are very helpful in relieving tension. · Limit caffeine by not drinking too much coffee, tea, or soda. But don't quit caffeine suddenly, because that can also give you headaches. · Reduce eyestrain from computers by blinking frequently and looking away from the computer screen every so often. Make sure you have proper eyewear and that your monitor is set up properly, about an arm's length away. · Seek help if you have depression or anxiety. Your headaches may be linked to these conditions. Treatment can both prevent headaches and help with symptoms of anxiety or depression. When should you call for help? Call 911 anytime you think you may need emergency care. For example, call if:  ? · You have signs of a stroke. These may include:  ¨ Sudden numbness, paralysis, or weakness in your face, arm, or leg, especially on only one side of your body. ¨ Sudden vision changes. ¨ Sudden trouble speaking. ¨ Sudden confusion or trouble understanding simple statements. ¨ Sudden problems with walking or balance. ¨ A sudden, severe headache that is different from past headaches. ?Call your doctor now or seek immediate medical care if:  ? · You have a new or worse headache. ? · Your headache gets much worse. Where can you learn more? Go to http://sabi-dakota.info/. Enter M271 in the search box to learn more about \"Headache: Care Instructions. \"  Current as of: October 14, 2016  Content Version: 11.4  © 9198-4336 [employer]. Care instructions adapted under license by [employer] (which disclaims liability or warranty for this information).  If you have questions about a medical condition or this instruction, always ask your healthcare professional. Felipe Boucher disclaims any warranty or liability for your use of this information.

## 2017-11-01 NOTE — ED NOTES
The patient was discharged home by Dr Aki Guillory in stable condition. The patient is alert and oriented, in no respiratory distress. The patient's diagnosis, condition and treatment were explained. The patient expressed understanding. A discharge plan has been developed. A  was not involved in the process. Aftercare instructions were given. Pt ambulatory out of the ED with family.

## 2017-11-01 NOTE — ED NOTES
\"My last visit I was here, I got double the medicine so my pain was a little less when I left. This time, my pain is still up there. \"

## 2018-09-22 ENCOUNTER — HOSPITAL ENCOUNTER (EMERGENCY)
Age: 48
Discharge: HOME | End: 2018-09-22
Attending: EMERGENCY MEDICINE
Payer: MEDICARE

## 2018-09-22 VITALS
OXYGEN SATURATION: 98 % | HEIGHT: 67 IN | BODY MASS INDEX: 31.45 KG/M2 | HEART RATE: 74 BPM | WEIGHT: 200.4 LBS | SYSTOLIC BLOOD PRESSURE: 135 MMHG | TEMPERATURE: 97.8 F | DIASTOLIC BLOOD PRESSURE: 83 MMHG | RESPIRATION RATE: 16 BRPM

## 2018-09-22 DIAGNOSIS — B37.0 THRUSH, ORAL: Primary | ICD-10-CM

## 2018-09-22 DIAGNOSIS — Z98.84 HISTORY OF GASTRIC BYPASS: ICD-10-CM

## 2018-09-22 DIAGNOSIS — R10.13 ABDOMINAL PAIN, EPIGASTRIC: ICD-10-CM

## 2018-09-22 DIAGNOSIS — T36.95XA ANTIBIOTICS CAUSING ADVERSE EFFECT IN THERAPEUTIC USE, INITIAL ENCOUNTER: ICD-10-CM

## 2018-09-22 LAB
ALBUMIN SERPL-MCNC: 3.5 G/DL (ref 3.5–5)
ALBUMIN/GLOB SERPL: 0.8 {RATIO} (ref 1.1–2.2)
ALP SERPL-CCNC: 133 U/L (ref 45–117)
ALT SERPL-CCNC: 20 U/L (ref 12–78)
ANION GAP SERPL CALC-SCNC: 3 MMOL/L (ref 5–15)
APPEARANCE UR: CLEAR
AST SERPL-CCNC: 21 U/L (ref 15–37)
BACTERIA URNS QL MICRO: ABNORMAL /HPF
BASOPHILS # BLD: 0 K/UL (ref 0–0.1)
BASOPHILS NFR BLD: 0 % (ref 0–1)
BILIRUB SERPL-MCNC: 0.1 MG/DL (ref 0.2–1)
BILIRUB UR QL: NEGATIVE
BUN SERPL-MCNC: 7 MG/DL (ref 6–20)
BUN/CREAT SERPL: 9 (ref 12–20)
CALCIUM SERPL-MCNC: 9 MG/DL (ref 8.5–10.1)
CHLORIDE SERPL-SCNC: 102 MMOL/L (ref 97–108)
CO2 SERPL-SCNC: 32 MMOL/L (ref 21–32)
COLOR UR: ABNORMAL
CREAT SERPL-MCNC: 0.75 MG/DL (ref 0.55–1.02)
DIFFERENTIAL METHOD BLD: ABNORMAL
EOSINOPHIL # BLD: 0.5 K/UL (ref 0–0.4)
EOSINOPHIL NFR BLD: 4 % (ref 0–7)
EPITH CASTS URNS QL MICRO: ABNORMAL /LPF
ERYTHROCYTE [DISTWIDTH] IN BLOOD BY AUTOMATED COUNT: 14.8 % (ref 11.5–14.5)
GLOBULIN SER CALC-MCNC: 4.6 G/DL (ref 2–4)
GLUCOSE SERPL-MCNC: 96 MG/DL (ref 65–100)
GLUCOSE UR STRIP.AUTO-MCNC: NEGATIVE MG/DL
HCT VFR BLD AUTO: 38.4 % (ref 35–47)
HGB BLD-MCNC: 11.9 G/DL (ref 11.5–16)
HGB UR QL STRIP: ABNORMAL
KETONES UR QL STRIP.AUTO: NEGATIVE MG/DL
LEUKOCYTE ESTERASE UR QL STRIP.AUTO: ABNORMAL
LIPASE SERPL-CCNC: 211 U/L (ref 73–393)
LYMPHOCYTES # BLD: 3.4 K/UL (ref 0.8–3.5)
LYMPHOCYTES NFR BLD: 30 % (ref 12–49)
MCH RBC QN AUTO: 25.6 PG (ref 26–34)
MCHC RBC AUTO-ENTMCNC: 31 G/DL (ref 30–36.5)
MCV RBC AUTO: 82.6 FL (ref 80–99)
MONOCYTES # BLD: 0.9 K/UL (ref 0–1)
MONOCYTES NFR BLD: 8 % (ref 5–13)
MUCOUS THREADS URNS QL MICRO: ABNORMAL /LPF
NEUTS SEG # BLD: 6.4 K/UL (ref 1.8–8)
NEUTS SEG NFR BLD: 58 % (ref 32–75)
NITRITE UR QL STRIP.AUTO: NEGATIVE
PH UR STRIP: 7.5 [PH] (ref 5–8)
PLATELET # BLD AUTO: 357 K/UL (ref 150–400)
PMV BLD AUTO: 8.2 FL (ref 8.9–12.9)
POTASSIUM SERPL-SCNC: 4.7 MMOL/L (ref 3.5–5.1)
PROT SERPL-MCNC: 8.1 G/DL (ref 6.4–8.2)
PROT UR STRIP-MCNC: NEGATIVE MG/DL
RBC # BLD AUTO: 4.65 M/UL (ref 3.8–5.2)
RBC #/AREA URNS HPF: ABNORMAL /HPF (ref 0–5)
SODIUM SERPL-SCNC: 137 MMOL/L (ref 136–145)
SP GR UR REFRACTOMETRY: 1.01 (ref 1–1.03)
TROPONIN I SERPL-MCNC: <0.05 NG/ML
UR CULT HOLD, URHOLD: NORMAL
UROBILINOGEN UR QL STRIP.AUTO: 0.2 EU/DL (ref 0.2–1)
WBC # BLD AUTO: 11.1 K/UL (ref 3.6–11)
WBC URNS QL MICRO: ABNORMAL /HPF (ref 0–4)
XXWBCSUS: 0

## 2018-09-22 PROCEDURE — 83690 ASSAY OF LIPASE: CPT | Performed by: PHYSICIAN ASSISTANT

## 2018-09-22 PROCEDURE — 74011250636 HC RX REV CODE- 250/636: Performed by: PHYSICIAN ASSISTANT

## 2018-09-22 PROCEDURE — 74011250637 HC RX REV CODE- 250/637: Performed by: PHYSICIAN ASSISTANT

## 2018-09-22 PROCEDURE — 81001 URINALYSIS AUTO W/SCOPE: CPT | Performed by: PHYSICIAN ASSISTANT

## 2018-09-22 PROCEDURE — 74011000250 HC RX REV CODE- 250: Performed by: PHYSICIAN ASSISTANT

## 2018-09-22 PROCEDURE — 93005 ELECTROCARDIOGRAM TRACING: CPT

## 2018-09-22 PROCEDURE — 99284 EMERGENCY DEPT VISIT MOD MDM: CPT

## 2018-09-22 PROCEDURE — 85025 COMPLETE CBC W/AUTO DIFF WBC: CPT | Performed by: PHYSICIAN ASSISTANT

## 2018-09-22 PROCEDURE — 36415 COLL VENOUS BLD VENIPUNCTURE: CPT | Performed by: PHYSICIAN ASSISTANT

## 2018-09-22 PROCEDURE — 96374 THER/PROPH/DIAG INJ IV PUSH: CPT

## 2018-09-22 PROCEDURE — 84484 ASSAY OF TROPONIN QUANT: CPT | Performed by: PHYSICIAN ASSISTANT

## 2018-09-22 PROCEDURE — 80053 COMPREHEN METABOLIC PANEL: CPT | Performed by: PHYSICIAN ASSISTANT

## 2018-09-22 RX ORDER — FAMOTIDINE 20 MG/1
20 TABLET, FILM COATED ORAL 2 TIMES DAILY
Qty: 20 TAB | Refills: 0 | Status: SHIPPED | OUTPATIENT
Start: 2018-09-22 | End: 2019-07-16

## 2018-09-22 RX ORDER — LIDOCAINE HYDROCHLORIDE 20 MG/ML
SOLUTION OROPHARYNGEAL
Qty: 100 BOTTLE | Refills: 0 | Status: SHIPPED | OUTPATIENT
Start: 2018-09-22 | End: 2019-07-16

## 2018-09-22 RX ORDER — CLINDAMYCIN HYDROCHLORIDE 300 MG/1
300 CAPSULE ORAL 4 TIMES DAILY
COMMUNITY
End: 2019-07-16

## 2018-09-22 RX ORDER — FLUCONAZOLE 100 MG/1
200 TABLET ORAL
Status: COMPLETED | OUTPATIENT
Start: 2018-09-22 | End: 2018-09-22

## 2018-09-22 RX ORDER — LIDOCAINE HYDROCHLORIDE 20 MG/ML
15 SOLUTION OROPHARYNGEAL AS NEEDED
Qty: 1 BOTTLE | Refills: 0 | Status: SHIPPED | OUTPATIENT
Start: 2018-09-22 | End: 2018-09-22

## 2018-09-22 RX ORDER — FLUCONAZOLE 100 MG/1
100 TABLET ORAL DAILY
Qty: 7 TAB | Refills: 0 | Status: SHIPPED | OUTPATIENT
Start: 2018-09-22 | End: 2018-09-29

## 2018-09-22 RX ORDER — ONDANSETRON 2 MG/ML
4 INJECTION INTRAMUSCULAR; INTRAVENOUS
Status: DISCONTINUED | OUTPATIENT
Start: 2018-09-22 | End: 2018-09-22 | Stop reason: HOSPADM

## 2018-09-22 RX ORDER — HYDROCODONE BITARTRATE AND ACETAMINOPHEN 5; 325 MG/1; MG/1
1 TABLET ORAL
COMMUNITY
End: 2019-07-16

## 2018-09-22 RX ORDER — SUCRALFATE 1 G/1
1 TABLET ORAL 4 TIMES DAILY
Qty: 30 TAB | Refills: 0 | Status: SHIPPED | OUTPATIENT
Start: 2018-09-22 | End: 2019-07-16

## 2018-09-22 RX ADMIN — LIDOCAINE HYDROCHLORIDE 40 ML: 20 SOLUTION ORAL; TOPICAL at 18:54

## 2018-09-22 RX ADMIN — FAMOTIDINE 20 MG: 10 INJECTION, SOLUTION INTRAVENOUS at 18:51

## 2018-09-22 RX ADMIN — FLUCONAZOLE 200 MG: 100 TABLET ORAL at 19:50

## 2018-09-22 NOTE — ED NOTES
Pt was discharged and given instructions by PA. Pt verbalized good understanding of all discharge instructions,prescriptions and F/U care. All questions answered. Pt in stable condition on discharge.

## 2018-09-22 NOTE — ED NOTES
Report received from Jeanne Colindres [facility]. Assumed care of pt. Bed in locked and in low position with call bell within reach. Using AIDET - Introduced self as primary nurse and plan of care discussed with pt. Pt verbalizes understanding. Pt denies any additional complaints at this time. White board updated. Patient advised that medical information will be discussed and it is their responsibility to tell this nurse if such conversation should not take place in the presence of visitors. Pt verbalizes understanding.

## 2018-09-22 NOTE — ED PROVIDER NOTES
HPI Comments: Bertrand Mckinnon is a 50 y.o. female who presents ambulatory with her  to the ED with a c/o sore throat and chest pain. Pt notes she had a Peritonsillar abscess drained 9/16/18 at [address] [place] [address]. She had been treated for an ear infection previously. Pt had taken amoxil x 1 day, then [facility] and started on clindamycin. She has been taking 300mg QID as directed. She notes she is concerned for increased pain in her mouth and now a burning chest pain to her lower sternal/epigastric areas. Pt reports nausea with her sx. She denies f/c since her initial tx, v/d/c or urinary sx. Pt does note she is using otc tx for a yeast infection as a result of her clindamycin. She states she has been taking probiotics with her abx. Briana Grimes PCP: Sabrina Coe MD 
PMHx significant for: Past Medical History: 
No date: Asthma No date: Bronchitis No date: Fibromyalgia No date: Seasonal allergic reaction PSHx significant for: . Past Surgical History: 
No date: HX APPENDECTOMY No date: HX GASTRIC BYPASS No date: HX GYN No date: HX ORTHOPAEDIC Comment: lumbar fusion No date: HX TUBAL LIGATION Social Hx: Tobacco: denies current EtOH: denies  Illicit drug use: denies There are no further complaints or symptoms at this time. The history is provided by the patient and the spouse. Past Medical History:  
Diagnosis Date  Asthma  Bronchitis  Fibromyalgia  Seasonal allergic reaction Past Surgical History:  
Procedure Laterality Date  HX APPENDECTOMY  HX GASTRIC BYPASS  HX GYN    
 HX ORTHOPAEDIC    
 lumbar fusion  HX TUBAL LIGATION History reviewed. No pertinent family history. Social History Social History  Marital status:  Spouse name: N/A  
 Number of children: N/A  
 Years of education: N/A Occupational History  Not on file. Social History Main Topics  Smoking status: Former Smoker  Smokeless tobacco: Never Used  Alcohol use No  
 Drug use: No  
 Sexual activity: Not on file Other Topics Concern  Not on file Social History Narrative ALLERGIES: Lortab [hydrocodone-acetaminophen] and Percocet [oxycodone-acetaminophen] Review of Systems Constitutional: Negative for chills and fever. HENT: Positive for sore throat. Negative for congestion, rhinorrhea and sneezing. Eyes: Negative for redness and visual disturbance. Respiratory: Negative for shortness of breath. Cardiovascular: Positive for chest pain. Negative for leg swelling. Gastrointestinal: Positive for nausea. Negative for abdominal pain, constipation, diarrhea and vomiting. Genitourinary: Negative for difficulty urinating and frequency. Musculoskeletal: Negative for back pain, myalgias and neck stiffness. Skin: Negative for rash. Neurological: Negative for dizziness, syncope, weakness and headaches. Hematological: Negative for adenopathy. Vitals:  
 09/22/18 1825 09/22/18 1950 BP: (!) 145/102 135/83 Pulse: 86 74 Resp: 18 16 Temp: 97.8 °F (36.6 °C) SpO2: 96% 98% Weight: 90.9 kg (200 lb 6.4 oz) Height: 5' 7\" (1.702 m) Physical Exam  
Constitutional: She is oriented to person, place, and time. She appears well-developed and well-nourished. No distress. HENT:  
Head: Normocephalic and atraumatic. Right Ear: External ear normal.  
Left Ear: External ear normal.  
Nose: Nose normal.  
Oropharynx with petechiae/ bruising to posterior oropharynx on left from pta drainage. + white patchy exudate to tongue and left tonsillar pillar Uvula midline. No trismus. No difficulty handling secretions or speaking. No sublingual swelling Eyes: EOM are normal. Pupils are equal, round, and reactive to light. Neck: Neck supple. Cardiovascular: Normal rate, regular rhythm, normal heart sounds and intact distal pulses. Exam reveals no gallop and no friction rub. No murmur heard. Pulmonary/Chest: Effort normal and breath sounds normal. No stridor. No respiratory distress. She has no wheezes. She has no rales. She exhibits tenderness. Lower sternal chest TTP without swelling. No step off. No discoloration. No deformity or lesions. No subcutaneous air. No tracheal deviation. No changes in respiratory excursion Abdominal: Soft. Bowel sounds are normal. She exhibits no distension and no mass. There is tenderness. There is no rebound and no guarding. Epigastric ttp without rebounding or guarding. No CVAT Musculoskeletal: Normal range of motion. She exhibits no edema, tenderness or deformity. Neurological: She is alert and oriented to person, place, and time. No cranial nerve deficit. Coordination normal.  
Skin: No rash noted. No erythema. No pallor. Psychiatric: She has a normal mood and affect. Her behavior is normal.  
Nursing note and vitals reviewed. MDM Number of Diagnoses or Management Options Amount and/or Complexity of Data Reviewed Clinical lab tests: ordered and reviewed Tests in the medicine section of CPT®: reviewed and ordered Obtain history from someone other than the patient: yes () Review and summarize past medical records: yes Independent visualization of images, tracings, or specimens: yes Patient Progress Patient progress: stable ED Course Procedures 7:00 PM 
Discussed pt, sx, hx and current findings with Jaden Mc MD. He is in agreement with plan. Will get labs, ekg and give medications for same. Christian Chowdhury. DIANELYS Sawant 
 
ED EKG interpretation:6:49 PM 
Rhythm: normal sinus rhythm; and regular . Rate (approx.): 79; Axis: normal; P wave: normal; QRS interval: normal ; ST/T wave: normal; Other findings: normal. This EKG was interpreted by Yuan Butts MD,ED Provider. 7:27 PM  
Pt feeling much better. Dr Florecita Munoz in to see pt.  Suspect sx related to abx: thrush, yeast and gastritis, kaveh in light of pt having gastric bypass. [facility]rics at [facility] where she had her surgery Yannick Healy. DIANELYS Sawant 
 
LABORATORY TESTS: 
Recent Results (from the past 12 hour(s)) CBC WITH AUTOMATED DIFF Collection Time: 09/22/18  6:44 PM  
Result Value Ref Range WBC 11.1 (H) 3.6 - 11.0 K/uL  
 RBC 4.65 3.80 - 5.20 M/uL  
 HGB 11.9 11.5 - 16.0 g/dL HCT 38.4 35.0 - 47.0 % MCV 82.6 80.0 - 99.0 FL  
 MCH 25.6 (L) 26.0 - 34.0 PG  
 MCHC 31.0 30.0 - 36.5 g/dL  
 RDW 14.8 (H) 11.5 - 14.5 % PLATELET 255 459 - 758 K/uL MPV 8.2 (L) 8.9 - 12.9 FL  
 NEUTROPHILS 58 32 - 75 % LYMPHOCYTES 30 12 - 49 % MONOCYTES 8 5 - 13 % EOSINOPHILS 4 0 - 7 % BASOPHILS 0 0 - 1 %  
 ABS. NEUTROPHILS 6.4 1.8 - 8.0 K/UL  
 ABS. LYMPHOCYTES 3.4 0.8 - 3.5 K/UL  
 ABS. MONOCYTES 0.9 0.0 - 1.0 K/UL  
 ABS. EOSINOPHILS 0.5 (H) 0.0 - 0.4 K/UL  
 ABS. BASOPHILS 0.0 0.0 - 0.1 K/UL  
 DF AUTOMATED XXWBCSUS 0    
METABOLIC PANEL, COMPREHENSIVE Collection Time: 09/22/18  6:44 PM  
Result Value Ref Range Sodium 137 136 - 145 mmol/L Potassium 4.7 3.5 - 5.1 mmol/L Chloride 102 97 - 108 mmol/L  
 CO2 32 21 - 32 mmol/L Anion gap 3 (L) 5 - 15 mmol/L Glucose 96 65 - 100 mg/dL BUN 7 6 - 20 MG/DL Creatinine 0.75 0.55 - 1.02 MG/DL  
 BUN/Creatinine ratio 9 (L) 12 - 20 GFR est AA >60 >60 ml/min/1.73m2 GFR est non-AA >60 >60 ml/min/1.73m2 Calcium 9.0 8.5 - 10.1 MG/DL Bilirubin, total 0.1 (L) 0.2 - 1.0 MG/DL  
 ALT (SGPT) 20 12 - 78 U/L  
 AST (SGOT) 21 15 - 37 U/L Alk. phosphatase 133 (H) 45 - 117 U/L Protein, total 8.1 6.4 - 8.2 g/dL Albumin 3.5 3.5 - 5.0 g/dL Globulin 4.6 (H) 2.0 - 4.0 g/dL A-G Ratio 0.8 (L) 1.1 - 2.2    
TROPONIN I Collection Time: 09/22/18  6:44 PM  
Result Value Ref Range Troponin-I, Qt. <0.05 <0.05 ng/mL URINALYSIS W/MICROSCOPIC Collection Time: 09/22/18  6:44 PM  
Result Value Ref Range Color YELLOW/STRAW Appearance CLEAR CLEAR Specific gravity 1.010 1.003 - 1.030    
 pH (UA) 7.5 5.0 - 8.0 Protein NEGATIVE  NEG mg/dL Glucose NEGATIVE  NEG mg/dL Ketone NEGATIVE  NEG mg/dL Bilirubin NEGATIVE  NEG Blood MODERATE (A) NEG Urobilinogen 0.2 0.2 - 1.0 EU/dL Nitrites NEGATIVE  NEG Leukocyte Esterase SMALL (A) NEG    
 WBC 0-4 0 - 4 /hpf  
 RBC 5-10 0 - 5 /hpf Epithelial cells MODERATE (A) FEW /lpf Bacteria 1+ (A) NEG /hpf Mucus TRACE (A) NEG /lpf URINE CULTURE HOLD SAMPLE Collection Time: 09/22/18  6:44 PM  
Result Value Ref Range Urine culture hold URINE ON HOLD IN MICROBIOLOGY DEPT FOR 3 DAYS. IF UNPRESERVED URINE IS SUBMITTED, IT CANNOT BE USED FOR ADDITIONAL TESTING AFTER 24 HRS, RECOLLECTION WILL BE REQUIRED. LIPASE Collection Time: 09/22/18  6:44 PM  
Result Value Ref Range Lipase 211 73 - 393 U/L  
EKG, 12 LEAD, INITIAL Collection Time: 09/22/18  6:49 PM  
Result Value Ref Range Ventricular Rate 79 BPM  
 Atrial Rate 79 BPM  
 P-R Interval 122 ms QRS Duration 86 ms  
 Q-T Interval 388 ms QTC Calculation (Bezet) 444 ms Calculated P Axis 70 degrees Calculated R Axis 53 degrees Calculated T Axis 37 degrees Diagnosis Normal sinus rhythm Normal ECG No previous ECGs available IMAGING RESULTS: 
 
No results found. MEDICATIONS GIVEN: 
Medications  
ondansetron (ZOFRAN) injection 4 mg (4 mg IntraVENous Refused 9/22/18 1836) mylanta/viscous lidocaine (EFRAÍN)(GI COCKTAIL) (40 mL Oral Given 9/22/18 1854)  
famotidine (PF) (PEPCID) 20 mg in sodium chloride 0.9% 10 mL injection (20 mg IntraVENous Given 9/22/18 1851) fluconazole (DIFLUCAN) tablet 200 mg (200 mg Oral Given 9/22/18 1950) IMPRESSION: 
1. Thrush, oral   
2. Abdominal pain, epigastric 3. History of gastric bypass 4. Antibiotics causing adverse effect in therapeutic use, initial encounter PLAN: 
1. Discharge Medication List as of 9/22/2018  7:40 PM  
  
START taking these medications Details  
famotidine (PEPCID) 20 mg tablet Take 1 Tab by mouth two (2) times a day., Print, Disp-20 Tab, R-0  
  
sucralfate (CARAFATE) 1 gram tablet Take 1 Tab by mouth four (4) times daily. , Print, Disp-30 Tab, R-0  
  
fluconazole (DIFLUCAN) 100 mg tablet Take 1 Tab by mouth daily for 7 days. FDA advises cautious prescribing of oral fluconazole in pregnancy. , Print, Disp-7 Tab, R-0  
  
lidocaine (LIDOCAINE VISCOUS) 2 % solution 5mL swish and gargle every 4-6 hours as needed for pain, Print, Disp-100 Bottle, R-0  
  
  
CONTINUE these medications which have NOT CHANGED Details  
clindamycin (CLEOCIN) 300 mg capsule Take 300 mg by mouth four (4) times daily. , Historical Med HYDROcodone-acetaminophen (NORCO) 5-325 mg per tablet Take 1 Tab by mouth every six (6) hours as needed for Pain., Historical Med  
  
ARIPiprazole (ABILIFY) 5 mg tablet Take 5 mg by mouth daily. , Historical Med  
  
clonazePAM (KLONOPIN) 0.5 mg tablet Take 4 mg by mouth daily. Indications: BART ASSOCIATED WITH BIPOLAR DISORDER, ADJUNCT, Historical Med 2. Follow-up Information Follow up With Details Comments Contact Info [address] [place] [address] DEPT OF OTOLARYNGOLOGY  2-4 days for recheck  [address]  
[address] [facility] [address] 
[phone] Radha Samano MD Schedule an appointment as soon as possible for a visit  for future ENT care [address] [facility] [address] [place] [address] 
[phone] 
  
 your bariatric doctor at [facility] Schedule an appointment as soon as possible for a visit 2-4 days for recheck Return to ED if worse 7:30 PM 
Pt has been reexamined. Pt has no new complaints, changes or physical findings. Care plan outlined and precautions discussed. All available results were reviewed with pt. All medications were reviewed with pt. All of pt's questions and concerns were addressed.  Pt agrees to F/U as instructed and agrees to return to ED upon further deterioration. Pt is ready to go home.  
LUIS E Verdin

## 2018-09-22 NOTE — ED NOTES
Bedside and Verbal shift change report given to Juan Arroyo RN (oncoming nurse) by Federico Zelaya RN (offgoing nurse). Report included the following information SBAR, ED Summary, MAR and Recent Results.

## 2018-09-22 NOTE — DISCHARGE INSTRUCTIONS
Candidiasis: Care Instructions  Your Care Instructions  Candidiasis (say \"ces-wbm-JS-uh-lacy\") is a yeast infection. Yeast normally lives in your body. But it can cause problems if your body's defenses don't work as they should. Some medicines can increase your chance of getting a yeast infection. These include antibiotics, steroids, and cancer drugs. And some diseases like AIDS and diabetes can make you more likely to get yeast infections. There are different types of yeast infections. Yennifer Silverio is a yeast infection in the mouth. It usually occurs in people with weak immune systems. It causes white patches inside the mouth and throat. Yeast infections of the skin usually occur in skin folds where the skin stays moist. They cause red, oozing patches on your skin. Babies can get these infections under the diaper. People who often wear gloves can get them on their hands. Many women get vaginal yeast infections. They are most common when women take antibiotics. These infections can cause the vagina to itch and burn. They also cause white discharge that looks like cottage cheese. In rare cases, yeast infects the blood. This can cause serious disease. This kind of infection is treated with medicine given through a needle into a vein (IV). After you start treatment, a yeast infection usually goes away quickly. But if your immune system is weak, the infection may come back. Tell your doctor if you get yeast infections often. Follow-up care is a key part of your treatment and safety. Be sure to make and go to all appointments, and call your doctor if you are having problems. It's also a good idea to know your test results and keep a list of the medicines you take. How can you care for yourself at home? · Take your medicines exactly as prescribed. Call your doctor if you think you are having a problem with your medicine. · Use antibiotics only as directed by your doctor. · Eat yogurt with live cultures.  It has bacteria called lactobacillus. It may help prevent some types of yeast infections. · Keep your skin clean and dry. Put powder on moist places. · If you are using a cream or suppository to treat a vaginal yeast infection, don't use condoms or a diaphragm. Use a different type of birth control. · Eat a healthy diet and get regular exercise. This will help keep your immune system strong. When should you call for help? Watch closely for changes in your health, and be sure to contact your doctor if:    · You do not get better as expected. Where can you learn more? Go to http://sabiOndot Systemsdakota.info/. Enter R541 in the search box to learn more about \"Candidiasis: Care Instructions. \"  Current as of: October 6, 2017  Content Version: 11.7  © 6603-4871 [employer]. Care instructions adapted under license by [employer] (which disclaims liability or warranty for this information). If you have questions about a medical condition or this instruction, always ask your healthcare professional. [place] [address] any warranty or liability for your use of this information. Indigestion (Dyspepsia or Heartburn): Care Instructions  Your Care Instructions  Sometimes it can be hard to pinpoint the cause of indigestion. (It is also called dyspepsia or heartburn.) Most cases of an upset stomach with bloating, burning, burping, and nausea are minor and go away within several hours. Home treatment and over-the-counter medicine often are able to control symptoms. But if you take medicine to relieve your indigestion without making diet and lifestyle changes, your symptoms are likely to return again and again. If you get indigestion often, it may be a sign of a more serious medical problem. Be sure to follow up with your doctor, who may want to do tests to be sure of the cause of your indigestion. Follow-up care is a key part of your treatment and safety.  Be sure to make and go to all appointments, and call your doctor if you are having problems. It's also a good idea to know your test results and keep a list of the medicines you take. How can you care for yourself at home? · Your doctor may recommend over-the-counter medicine. For mild or occasional indigestion, antacids such as Gaviscon, Mylanta, Maalox, or Tums, may help. Be safe with medicines. Be careful when you take over-the-counter antacid medicines. Many of these medicines have aspirin in them. Read the label to make sure that you are not taking more than the recommended dose. Too much aspirin can be harmful. · Your doctor also may recommend over-the-counter acid reducers, such as Pepcid AC, Tagamet HB, Zantac 75, or Prilosec. Read and follow all instructions on the label. If you use these medicines often, talk with your doctor. · Change your eating habits. ¨ It's best to eat several small meals instead of two or three large meals. ¨ After you eat, wait 2 to 3 hours before you lie down. ¨ Chocolate, mint, and alcohol can make GERD worse. ¨ Spicy foods, foods that have a lot of acid (like tomatoes and oranges), and coffee can make GERD symptoms worse in some people. If your symptoms are worse after you eat a certain food, you may want to stop eating that food to see if your symptoms get better. · Do not smoke or chew tobacco. Smoking can make GERD worse. If you need help quitting, talk to your doctor about stop-smoking programs and medicines. These can increase your chances of quitting for good. · If you have GERD symptoms at night, raise the head of your bed 6 to 8 inches. You can do this by putting the frame on blocks or placing a foam wedge under the head of your mattress. (Adding extra pillows does not work.)  · Do not wear tight clothing around your middle. · Lose weight if you need to. Losing just 5 to 10 pounds can help.   · Do not take anti-inflammatory medicines, such as aspirin, ibuprofen (Advil, Motrin), or naproxen (Aleve). These can irritate the stomach. If you need a pain medicine, try acetaminophen (Tylenol), which does not cause stomach upset. When should you call for help? Call your doctor now or seek immediate medical care if:    · You have new or worse belly pain.     · You are vomiting.    Watch closely for changes in your health, and be sure to contact your doctor if:    · You have new or worse symptoms of indigestion.     · You have trouble or pain swallowing.     · You are losing weight.     · You do not get better as expected. Where can you learn more? Go to http://sabi-dakota.info/. Enter V870 in the search box to learn more about \"Indigestion (Dyspepsia or Heartburn): Care Instructions. \"  Current as of: May 12, 2017  Content Version: 11.7  © 9819-3593 [employer]. Care instructions adapted under license by [employer] (which disclaims liability or warranty for this information). If you have questions about a medical condition or this instruction, always ask your healthcare professional. [address] any warranty or liability for your use of this information.

## 2018-09-24 LAB
ATRIAL RATE: 79 BPM
CALCULATED P AXIS, ECG09: 70 DEGREES
CALCULATED R AXIS, ECG10: 53 DEGREES
CALCULATED T AXIS, ECG11: 37 DEGREES
DIAGNOSIS, 93000: NORMAL
P-R INTERVAL, ECG05: 122 MS
Q-T INTERVAL, ECG07: 388 MS
QRS DURATION, ECG06: 86 MS
QTC CALCULATION (BEZET), ECG08: 444 MS
VENTRICULAR RATE, ECG03: 79 BPM

## 2019-05-10 ENCOUNTER — HOSPITAL ENCOUNTER (EMERGENCY)
Age: 49
Discharge: HOME | End: 2019-05-10
Attending: EMERGENCY MEDICINE
Payer: MEDICARE

## 2019-05-10 VITALS
HEIGHT: 66 IN | DIASTOLIC BLOOD PRESSURE: 68 MMHG | RESPIRATION RATE: 18 BRPM | HEART RATE: 75 BPM | SYSTOLIC BLOOD PRESSURE: 130 MMHG | WEIGHT: 178.79 LBS | TEMPERATURE: 97.7 F | OXYGEN SATURATION: 97 % | BODY MASS INDEX: 28.73 KG/M2

## 2019-05-10 DIAGNOSIS — M25.50 ARTHRALGIA, UNSPECIFIED JOINT: Primary | ICD-10-CM

## 2019-05-10 PROCEDURE — 74011250636 HC RX REV CODE- 250/636: Performed by: EMERGENCY MEDICINE

## 2019-05-10 PROCEDURE — 96372 THER/PROPH/DIAG INJ SC/IM: CPT

## 2019-05-10 PROCEDURE — 99282 EMERGENCY DEPT VISIT SF MDM: CPT

## 2019-05-10 RX ORDER — METHYLPREDNISOLONE 4 MG/1
TABLET ORAL
Qty: 1 DOSE PACK | Refills: 0 | Status: SHIPPED | OUTPATIENT
Start: 2019-05-10 | End: 2019-07-16

## 2019-05-10 RX ORDER — DEXAMETHASONE SODIUM PHOSPHATE 4 MG/ML
10 INJECTION, SOLUTION INTRA-ARTICULAR; INTRALESIONAL; INTRAMUSCULAR; INTRAVENOUS; SOFT TISSUE
Status: COMPLETED | OUTPATIENT
Start: 2019-05-10 | End: 2019-05-10

## 2019-05-10 RX ORDER — ESOMEPRAZOLE MAGNESIUM 40 MG/1
40 CAPSULE, DELAYED RELEASE ORAL DAILY
COMMUNITY

## 2019-05-10 RX ADMIN — DEXAMETHASONE SODIUM PHOSPHATE 10 MG: 4 INJECTION, SOLUTION INTRAMUSCULAR; INTRAVENOUS at 10:49

## 2019-05-10 NOTE — DISCHARGE INSTRUCTIONS
Patient Education     Musculoskeletal Pain: Care Instructions  Your Care Instructions  Different problems with the bones, muscles, nerves, ligaments, and tendons in the body can cause pain. One or more areas of your body may ache or burn. Or they may feel tired, stiff, or sore. The medical term for this type of pain is musculoskeletal pain. It can have many different causes. Sometimes the pain is caused by an injury such as a strain or sprain. Or you might have pain from using one part of your body in the same way over and over again. This is called overuse. In some cases, the cause of the pain is another health problem such as arthritis or fibromyalgia. The doctor will examine you and ask you questions about your health to help find the cause of your pain. Blood tests or imaging tests like an X-ray may also be helpful. But sometimes doctors can't find a cause of the pain. Treatment depends on your symptoms and the cause of the pain, if known. The doctor has checked you carefully, but problems can develop later. If you notice any problems or new symptoms, get medical treatment right away. Follow-up care is a key part of your treatment and safety. Be sure to make and go to all appointments, and call your doctor if you are having problems. It's also a good idea to know your test results and keep a list of the medicines you take. How can you care for yourself at home? · Rest until you feel better. · Do not do anything that makes the pain worse. Return to exercise gradually if you feel better and your doctor says it's okay. · Be safe with medicines. Read and follow all instructions on the label. ¨ If the doctor gave you a prescription medicine for pain, take it as prescribed. ¨ If you are not taking a prescription pain medicine, ask your doctor if you can take an over-the-counter medicine. · Put ice or a cold pack on the area for 10 to 20 minutes at a time to ease pain.  Put a thin cloth between the ice and your skin. When should you call for help? Call your doctor now or seek immediate medical care if:  · You have new pain, or your pain gets worse. · You have new symptoms such as a fever, a rash, or chills. Watch closely for changes in your health, and be sure to contact your doctor if:  · You do not get better as expected. Where can you learn more? Go to Ablynx.be  Enter Q624 in the search box to learn more about \"Musculoskeletal Pain: Care Instructions. \"   © 4914-9136 [employer]. Care instructions adapted under license by [employer] (which disclaims liability or warranty for this information). This care instruction is for use with your licensed healthcare professional. If you have questions about a medical condition or this instruction, always ask your healthcare professional. [address] any warranty or liability for your use of this information.   Content Version: 52.5.227469; Current as of: November 20, 2015

## 2019-05-10 NOTE — ED PROVIDER NOTES
57-year-old female with a history of fibromyalgia presents with bilateral hand and bilateral leg pain for the past 3 days. She also states that her legs are numb. She rates her pain as severe. She denies any fevers or chills. She is followed by her primary care doctor for a fibroma and takes Clonopin and Abilify. She was not able to get to her primary care doctor so presents to the emergency department. Finger Swelling Pertinent negatives include no back pain. Past Medical History:  
Diagnosis Date  Asthma  Bronchitis  Fibromyalgia  Seasonal allergic reaction Past Surgical History:  
Procedure Laterality Date  HX APPENDECTOMY  HX GASTRIC BYPASS  HX GYN    
 HX ORTHOPAEDIC    
 lumbar fusion  HX TUBAL LIGATION History reviewed. No pertinent family history. Social History Socioeconomic History  Marital status:  Spouse name: Not on file  Number of children: Not on file  Years of education: Not on file  Highest education level: Not on file Occupational History  Not on file Social Needs  Financial resource strain: Not on file  Food insecurity:  
  Worry: Not on file Inability: Not on file  Transportation needs:  
  Medical: Not on file Non-medical: Not on file Tobacco Use  Smoking status: Former Smoker  Smokeless tobacco: Never Used Substance and Sexual Activity  Alcohol use: Yes Comment: rarely  Drug use: No  
 Sexual activity: Not on file Lifestyle  Physical activity:  
  Days per week: Not on file Minutes per session: Not on file  Stress: Not on file Relationships  Social connections:  
  Talks on phone: Not on file Gets together: Not on file Attends [employer] service: Not on file Active member of club or organization: Not on file Attends meetings of clubs or organizations: Not on file Relationship status: Not on file  Intimate partner violence:  
  Fear of current or ex partner: Not on file Emotionally abused: Not on file Physically abused: Not on file Forced sexual activity: Not on file Other Topics Concern  Not on file Social History Narrative  Not on file ALLERGIES: Lortab [hydrocodone-acetaminophen] and Percocet [oxycodone-acetaminophen] Review of Systems Constitutional: Negative for chills and fever. HENT: Negative for ear pain and sore throat. Eyes: Negative for pain. Respiratory: Negative for chest tightness and shortness of breath. Cardiovascular: Negative for chest pain and leg swelling. Gastrointestinal: Negative for abdominal pain, nausea and vomiting. Genitourinary: Negative for dysuria and flank pain. Musculoskeletal: Negative for back pain. Skin: Negative for rash. Neurological: Negative for headaches. All other systems reviewed and are negative. Vitals:  
 05/10/19 1028 BP: 130/68 Pulse: 75 Resp: 18 Temp: 97.7 °F (36.5 °C) SpO2: 97% Weight: 81.1 kg (178 lb 12.7 oz) Height: 5' 6\" (1.676 m) Physical Exam  
Constitutional: She appears well-developed and well-nourished. HENT:  
Head: Normocephalic and atraumatic. Right Ear: Tympanic membrane normal.  
Left Ear: Tympanic membrane normal.  
Eyes: No scleral icterus. Neck: No tracheal deviation present. Cardiovascular: Normal rate. Pulmonary/Chest: Effort normal. No respiratory distress. Abdominal: She exhibits no distension. Musculoskeletal: She exhibits tenderness (diffuse extremities). She exhibits no deformity. Neurological: She is alert. Skin: Skin is dry. Psychiatric: She has a normal mood and affect. MDM Number of Diagnoses or Management Options Arthralgia, unspecified joint:  
Diagnosis management comments: No signs or symptoms of emergent condition requiring surgery or hospitalization.   Treated with decadron and steroid pack. Refer to rheumatology. Given return precautions. Procedures Kala Chow.  Rosa Maria Pollard MD

## 2019-05-10 NOTE — ED TRIAGE NOTES
Pt ambulates to treatment area she states that for the past 3 days she has had pain in both thumbs that radiates into the rest of her hands and last night the pain radiated into the rest of her body and down into her feet with nausea and headache. She has fibromyalgia and thinks that this maybe a flare up but it has never happened this way before.

## 2019-07-16 ENCOUNTER — APPOINTMENT (OUTPATIENT)
Dept: CT IMAGING | Age: 49
End: 2019-07-16
Attending: EMERGENCY MEDICINE
Payer: MEDICARE

## 2019-07-16 ENCOUNTER — HOSPITAL ENCOUNTER (EMERGENCY)
Age: 49
Discharge: HOME | End: 2019-07-16
Attending: EMERGENCY MEDICINE
Payer: MEDICARE

## 2019-07-16 VITALS
SYSTOLIC BLOOD PRESSURE: 124 MMHG | WEIGHT: 174.38 LBS | OXYGEN SATURATION: 98 % | TEMPERATURE: 97.7 F | DIASTOLIC BLOOD PRESSURE: 84 MMHG | HEIGHT: 64 IN | RESPIRATION RATE: 14 BRPM | BODY MASS INDEX: 29.77 KG/M2 | HEART RATE: 75 BPM

## 2019-07-16 DIAGNOSIS — G43.809 OTHER MIGRAINE WITHOUT STATUS MIGRAINOSUS, NOT INTRACTABLE: Primary | ICD-10-CM

## 2019-07-16 PROCEDURE — 96375 TX/PRO/DX INJ NEW DRUG ADDON: CPT

## 2019-07-16 PROCEDURE — 99283 EMERGENCY DEPT VISIT LOW MDM: CPT

## 2019-07-16 PROCEDURE — 96374 THER/PROPH/DIAG INJ IV PUSH: CPT

## 2019-07-16 PROCEDURE — 96361 HYDRATE IV INFUSION ADD-ON: CPT

## 2019-07-16 PROCEDURE — 74011250636 HC RX REV CODE- 250/636: Performed by: EMERGENCY MEDICINE

## 2019-07-16 PROCEDURE — 74011000250 HC RX REV CODE- 250: Performed by: EMERGENCY MEDICINE

## 2019-07-16 RX ORDER — KETOROLAC TROMETHAMINE 30 MG/ML
15 INJECTION, SOLUTION INTRAMUSCULAR; INTRAVENOUS
Status: COMPLETED | OUTPATIENT
Start: 2019-07-16 | End: 2019-07-16

## 2019-07-16 RX ORDER — PROCHLORPERAZINE EDISYLATE 5 MG/ML
10 INJECTION INTRAMUSCULAR; INTRAVENOUS
Status: DISCONTINUED | OUTPATIENT
Start: 2019-07-16 | End: 2019-07-16 | Stop reason: SDUPTHER

## 2019-07-16 RX ORDER — DIPHENHYDRAMINE HYDROCHLORIDE 50 MG/ML
25 INJECTION, SOLUTION INTRAMUSCULAR; INTRAVENOUS
Status: COMPLETED | OUTPATIENT
Start: 2019-07-16 | End: 2019-07-16

## 2019-07-16 RX ADMIN — DIPHENHYDRAMINE HYDROCHLORIDE 25 MG: 50 INJECTION, SOLUTION INTRAMUSCULAR; INTRAVENOUS at 15:51

## 2019-07-16 RX ADMIN — SODIUM CHLORIDE 10 MG: 9 INJECTION INTRAMUSCULAR; INTRAVENOUS; SUBCUTANEOUS at 15:51

## 2019-07-16 RX ADMIN — SODIUM CHLORIDE 1000 ML: 900 INJECTION, SOLUTION INTRAVENOUS at 15:51

## 2019-07-16 RX ADMIN — KETOROLAC TROMETHAMINE 15 MG: 30 INJECTION, SOLUTION INTRAMUSCULAR at 15:51

## 2019-07-16 NOTE — ED PROVIDER NOTES
'migraine since yesterday/ took pseudophed and its worse today/ I have fibromyalgia also/ assoc w/ chills/ nausea;     pt denies trauma, vison changes, diff swallowing, CP, SOB, Abd pain, Fevers, Vomiting, D/Cons or other current systemic complaints        The history is provided by the patient and the spouse. Migraine    This is a new problem. The current episode started yesterday. The problem has been gradually worsening. The headache is aggravated by nothing. The pain is located in the left unilateral and frontal region. The quality of the pain is described as sharp and throbbing. The pain is moderate. Associated symptoms include nausea. Pertinent negatives include no anorexia, no fever, no malaise/fatigue, no chest pressure, no near-syncope, no orthopnea, no palpitations, no syncope, no shortness of breath, no weakness, no tingling, no dizziness, no visual change and no vomiting. Treatments tried: pseudofed. The treatment provided no relief. Past Medical History:   Diagnosis Date    Asthma     Bronchitis     Fibromyalgia     Seasonal allergic reaction        Past Surgical History:   Procedure Laterality Date    HX APPENDECTOMY      HX GASTRIC BYPASS      HX GYN      HX ORTHOPAEDIC      lumbar fusion    HX TUBAL LIGATION           History reviewed. No pertinent family history.     Social History     Socioeconomic History    Marital status:      Spouse name: Not on file    Number of children: Not on file    Years of education: Not on file    Highest education level: Not on file   Occupational History    Not on file   Social Needs    Financial resource strain: Not on file    Food insecurity:     Worry: Not on file     Inability: Not on file    Transportation needs:     Medical: Not on file     Non-medical: Not on file   Tobacco Use    Smoking status: Former Smoker    Smokeless tobacco: Never Used   Substance and Sexual Activity    Alcohol use: Yes     Comment: rarely    Drug use: No    Sexual activity: Not on file   Lifestyle    Physical activity:     Days per week: Not on file     Minutes per session: Not on file    Stress: Not on file   Relationships    Social connections:     Talks on phone: Not on file     Gets together: Not on file     Attends [employer] service: Not on file     Active member of club or organization: Not on file     Attends meetings of clubs or organizations: Not on file     Relationship status: Not on file    Intimate partner violence:     Fear of current or ex partner: Not on file     Emotionally abused: Not on file     Physically abused: Not on file     Forced sexual activity: Not on file   Other Topics Concern    Not on file   Social History Narrative    Not on file         ALLERGIES: Lortab [hydrocodone-acetaminophen]; Nsaids (non-steroidal anti-inflammatory drug); and Percocet [oxycodone-acetaminophen]    Review of Systems   Constitutional: Positive for chills. Negative for fever and malaise/fatigue. HENT: Positive for congestion. Negative for trouble swallowing and voice change. Respiratory: Negative for shortness of breath. Cardiovascular: Negative for palpitations, orthopnea, syncope and near-syncope. Gastrointestinal: Positive for nausea. Negative for abdominal pain, anorexia, constipation and vomiting. Genitourinary: Negative for dysuria. Musculoskeletal: Negative for back pain and myalgias. Skin: Negative for rash. Neurological: Positive for headaches. Negative for dizziness, tingling and weakness. All other systems reviewed and are negative. Vitals:    07/16/19 1534   BP: 131/80   Pulse: 77   Resp: 14   Temp: 97.7 °F (36.5 °C)   SpO2: 98%   Weight: 79.1 kg (174 lb 6.1 oz)   Height: 5' 4\" (1.626 m)            Physical Exam   Constitutional: She is oriented to person, place, and time. She appears well-developed and well-nourished.    NAD, AxOx4, speaking in complete sentences  gcs = 15   HENT:   Head: Normocephalic and atraumatic. Mouth/Throat: Oropharynx is clear and moist.   Eyes: Pupils are equal, round, and reactive to light. Conjunctivae and EOM are normal. Right eye exhibits no discharge. Left eye exhibits no discharge. No scleral icterus. Neck: Normal range of motion. Neck supple. No JVD present. No tracheal deviation present. Cardiovascular: Normal rate, regular rhythm, normal heart sounds and intact distal pulses. Exam reveals no gallop and no friction rub. No murmur heard. Pulmonary/Chest: Effort normal and breath sounds normal. No respiratory distress. She has no wheezes. She has no rales. She exhibits no tenderness. Abdominal: Soft. Bowel sounds are normal. There is no tenderness. There is no rebound and no guarding. Genitourinary: No vaginal discharge found. Genitourinary Comments: Pt denies urinary/ vaginal complaints   Musculoskeletal: Normal range of motion. She exhibits no edema or tenderness. Neurological: She is alert and oriented to person, place, and time. No cranial nerve deficit. She exhibits normal muscle tone. Coordination normal.   pt has motor/ CV/ Sensation grossly intact to all extremities, R = L in strength;   Skin: Skin is warm and dry. Capillary refill takes less than 2 seconds. No rash noted. No erythema. No pallor. Psychiatric: She has a normal mood and affect. Her behavior is normal. Thought content normal.   Nursing note and vitals reviewed. MDM       Procedures      4:23 PM  'I can feel the medicine starting to work';       5:03 PM Pt refusing CT head/ 'HA gone now - wants to go home';   Aaron Pillai  results have been reviewed with her. She has been counseled regarding her diagnosis. She verbally conveys understanding and agreement of the signs, symptoms, diagnosis, treatment and prognosis and additionally agrees to Call/ Arrange follow up as recommended with Dr. Chaparrita Min MD in 24 - 48 hours.   She also agrees with the care-plan and conveys that all of her questions have been answered. I have also put together some discharge instructions for her that include: 1) educational information regarding their diagnosis, 2) how to care for their diagnosis at home, as well a 3) list of reasons why they would want to return to the ED prior to their follow-up appointment, should their condition change or for concerns.

## 2019-07-16 NOTE — ED NOTES
The patient was discharged home by Dr Francheska Sanchez in stable condition. The patient is alert and oriented, in no respiratory distress and discharge vital signs obtained. The patient's diagnosis, condition and treatment were explained. The patient expressed understanding. A discharge plan has been developed. Aftercare instructions were given. Pt ambulatory out of the ED.

## 2019-07-16 NOTE — DISCHARGE INSTRUCTIONS
Patient Education        Migraine Headache: Care Instructions  Your Care Instructions  Migraines are painful, throbbing headaches that often start on one side of the head. They may cause nausea and vomiting and make you sensitive to light, sound, or smell. Without treatment, migraines can last from 4 hours to a few days. Medicines can help prevent migraines or stop them after they have started. Your doctor can help you find which ones work best for you. Follow-up care is a key part of your treatment and safety. Be sure to make and go to all appointments, and call your doctor if you are having problems. It's also a good idea to know your test results and keep a list of the medicines you take. How can you care for yourself at home? · Do not drive if you have taken a prescription pain medicine. · Rest in a quiet, dark room until your headache is gone. Close your eyes, and try to relax or go to sleep. Don't watch TV or read. · Put a cold, moist cloth or cold pack on the painful area for 10 to 20 minutes at a time. Put a thin cloth between the cold pack and your skin. · Use a warm, moist towel or a heating pad set on low to relax tight shoulder and neck muscles. · Have someone gently massage your neck and shoulders. · Take your medicines exactly as prescribed. Call your doctor if you think you are having a problem with your medicine. You will get more details on the specific medicines your doctor prescribes. · Be careful not to take pain medicine more often than the instructions allow. You could get worse or more frequent headaches when the medicine wears off. To prevent migraines  · Keep a headache diary so you can figure out what triggers your headaches. Avoiding triggers may help you prevent headaches. Record when each headache began, how long it lasted, and what the pain was like.  (Was it throbbing, aching, stabbing, or dull?) Write down any other symptoms you had with the headache, such as nausea, flashing lights or dark spots, or sensitivity to bright light or loud noise. Note if the headache occurred near your period. List anything that might have triggered the headache. Triggers may include certain foods (chocolate, cheese, wine) or odors, smoke, bright light, stress, or lack of sleep. · If your doctor has prescribed medicine for your migraines, take it as directed. You may have medicine that you take only when you get a migraine and medicine that you take all the time to help prevent migraines. ? If your doctor has prescribed medicine for when you get a headache, take it at the first sign of a migraine, unless your doctor has given you other instructions. ? If your doctor has prescribed medicine to prevent migraines, take it exactly as prescribed. Call your doctor if you think you are having a problem with your medicine. · Find healthy ways to deal with stress. Migraines are most common during or right after stressful times. Take time to relax before and after you do something that has caused a migraine in the past.  · Try to keep your muscles relaxed by keeping good posture. Check your jaw, face, neck, and shoulder muscles for tension. Try to relax them. When you sit at a desk, change positions often. And make sure to stretch for 30 seconds each hour. · Get plenty of sleep and exercise. · Eat meals on a regular schedule. Avoid foods and drinks that often trigger migraines. These include chocolate, alcohol (especially red wine and port), aspartame, monosodium glutamate (MSG), and some additives found in foods (such as hot dogs, alexander, cold cuts, aged cheeses, and pickled foods). · Limit caffeine. Don't drink too much coffee, tea, or soda. But don't quit caffeine suddenly. That can also give you migraines. · Do not smoke or allow others to smoke around you. If you need help quitting, talk to your doctor about stop-smoking programs and medicines.  These can increase your chances of quitting for good.  · If you are taking birth control pills or hormone therapy, talk to your doctor about whether they are triggering your migraines. When should you call for help? Call 911 anytime you think you may need emergency care. For example, call if:    · You have signs of a stroke. These may include:  ? Sudden numbness, paralysis, or weakness in your face, arm, or leg, especially on only one side of your body. ? Sudden vision changes. ? Sudden trouble speaking. ? Sudden confusion or trouble understanding simple statements. ? Sudden problems with walking or balance. ? A sudden, severe headache that is different from past headaches.    Call your doctor now or seek immediate medical care if:    · You have new or worse nausea and vomiting.     · You have a new or higher fever.     · Your headache gets much worse.    Watch closely for changes in your health, and be sure to contact your doctor if:    · You are not getting better after 2 days (48 hours). Where can you learn more? Go to http://sabi-dakota.info/. Enter D145 in the search box to learn more about \"Migraine Headache: Care Instructions. \"  Current as of: Anisha 3, 2018  Content Version: 11.9  © 6611-2245 [employer]. Care instructions adapted under license by [employer] (which disclaims liability or warranty for this information). If you have questions about a medical condition or this instruction, always ask your healthcare professional. [place] [address] any warranty or liability for your use of this information.

## 2019-08-19 ENCOUNTER — HOSPITAL ENCOUNTER (EMERGENCY)
Age: 49
Discharge: HOME | End: 2019-08-19
Attending: EMERGENCY MEDICINE
Payer: MEDICARE

## 2019-08-19 ENCOUNTER — APPOINTMENT (OUTPATIENT)
Dept: CT IMAGING | Age: 49
End: 2019-08-19
Attending: EMERGENCY MEDICINE
Payer: MEDICARE

## 2019-08-19 VITALS
HEIGHT: 66 IN | RESPIRATION RATE: 16 BRPM | OXYGEN SATURATION: 96 % | TEMPERATURE: 98.4 F | HEART RATE: 63 BPM | BODY MASS INDEX: 28.03 KG/M2 | SYSTOLIC BLOOD PRESSURE: 110 MMHG | WEIGHT: 174.38 LBS | DIASTOLIC BLOOD PRESSURE: 87 MMHG

## 2019-08-19 DIAGNOSIS — R10.84 ABDOMINAL PAIN, GENERALIZED: ICD-10-CM

## 2019-08-19 DIAGNOSIS — R42 DIZZINESS: Primary | ICD-10-CM

## 2019-08-19 DIAGNOSIS — R11.2 NAUSEA AND VOMITING, INTRACTABILITY OF VOMITING NOT SPECIFIED, UNSPECIFIED VOMITING TYPE: ICD-10-CM

## 2019-08-19 LAB
ALBUMIN SERPL-MCNC: 3.5 G/DL (ref 3.5–5)
ALBUMIN/GLOB SERPL: 0.9 {RATIO} (ref 1.1–2.2)
ALP SERPL-CCNC: 119 U/L (ref 45–117)
ALT SERPL-CCNC: 22 U/L (ref 12–78)
ANION GAP SERPL CALC-SCNC: 9 MMOL/L (ref 5–15)
APPEARANCE UR: CLEAR
AST SERPL-CCNC: 22 U/L (ref 15–37)
ATRIAL RATE: 76 BPM
BACTERIA URNS QL MICRO: ABNORMAL /HPF
BASOPHILS # BLD: 0 K/UL (ref 0–0.1)
BASOPHILS NFR BLD: 1 % (ref 0–1)
BILIRUB SERPL-MCNC: 0.4 MG/DL (ref 0.2–1)
BILIRUB UR QL: NEGATIVE
BUN SERPL-MCNC: 6 MG/DL (ref 6–20)
BUN/CREAT SERPL: 8 (ref 12–20)
CALCIUM SERPL-MCNC: 9 MG/DL (ref 8.5–10.1)
CALCULATED P AXIS, ECG09: 66 DEGREES
CALCULATED R AXIS, ECG10: 3 DEGREES
CALCULATED T AXIS, ECG11: 24 DEGREES
CHLORIDE SERPL-SCNC: 103 MMOL/L (ref 97–108)
CO2 SERPL-SCNC: 29 MMOL/L (ref 21–32)
COLOR UR: ABNORMAL
COMMENT, HOLDF: NORMAL
CREAT SERPL-MCNC: 0.77 MG/DL (ref 0.55–1.02)
DIAGNOSIS, 93000: NORMAL
DIFFERENTIAL METHOD BLD: ABNORMAL
EOSINOPHIL # BLD: 0.1 K/UL (ref 0–0.4)
EOSINOPHIL NFR BLD: 1 % (ref 0–7)
EPITH CASTS URNS QL MICRO: ABNORMAL /LPF
ERYTHROCYTE [DISTWIDTH] IN BLOOD BY AUTOMATED COUNT: 14.4 % (ref 11.5–14.5)
GLOBULIN SER CALC-MCNC: 3.8 G/DL (ref 2–4)
GLUCOSE SERPL-MCNC: 111 MG/DL (ref 65–100)
GLUCOSE UR STRIP.AUTO-MCNC: NEGATIVE MG/DL
HCT VFR BLD AUTO: 37.1 % (ref 35–47)
HGB BLD-MCNC: 11.5 G/DL (ref 11.5–16)
HGB UR QL STRIP: NEGATIVE
IMM GRANULOCYTES # BLD AUTO: 0 K/UL (ref 0–0.04)
IMM GRANULOCYTES NFR BLD AUTO: 0 % (ref 0–0.5)
KETONES UR QL STRIP.AUTO: NEGATIVE MG/DL
LEUKOCYTE ESTERASE UR QL STRIP.AUTO: NEGATIVE
LIPASE SERPL-CCNC: 125 U/L (ref 73–393)
LYMPHOCYTES # BLD: 1.7 K/UL (ref 0.8–3.5)
LYMPHOCYTES NFR BLD: 21 % (ref 12–49)
MCH RBC QN AUTO: 25.2 PG (ref 26–34)
MCHC RBC AUTO-ENTMCNC: 31 G/DL (ref 30–36.5)
MCV RBC AUTO: 81.4 FL (ref 80–99)
MONOCYTES # BLD: 0.5 K/UL (ref 0–1)
MONOCYTES NFR BLD: 7 % (ref 5–13)
NEUTS SEG # BLD: 5.5 K/UL (ref 1.8–8)
NEUTS SEG NFR BLD: 70 % (ref 32–75)
NITRITE UR QL STRIP.AUTO: NEGATIVE
NRBC # BLD: 0 K/UL (ref 0–0.01)
NRBC BLD-RTO: 0 PER 100 WBC
P-R INTERVAL, ECG05: 120 MS
PH UR STRIP: 7.5 [PH] (ref 5–8)
PLATELET # BLD AUTO: 259 K/UL (ref 150–400)
PMV BLD AUTO: 8.4 FL (ref 8.9–12.9)
POTASSIUM SERPL-SCNC: 3.8 MMOL/L (ref 3.5–5.1)
PROT SERPL-MCNC: 7.3 G/DL (ref 6.4–8.2)
PROT UR STRIP-MCNC: NEGATIVE MG/DL
Q-T INTERVAL, ECG07: 404 MS
QRS DURATION, ECG06: 80 MS
QTC CALCULATION (BEZET), ECG08: 454 MS
RBC # BLD AUTO: 4.56 M/UL (ref 3.8–5.2)
RBC #/AREA URNS HPF: ABNORMAL /HPF (ref 0–5)
SAMPLES BEING HELD,HOLD: NORMAL
SODIUM SERPL-SCNC: 141 MMOL/L (ref 136–145)
SP GR UR REFRACTOMETRY: 1.01 (ref 1–1.03)
TROPONIN I SERPL-MCNC: <0.05 NG/ML
UROBILINOGEN UR QL STRIP.AUTO: 0.2 EU/DL (ref 0.2–1)
VENTRICULAR RATE, ECG03: 76 BPM
WBC # BLD AUTO: 7.8 K/UL (ref 3.6–11)
WBC URNS QL MICRO: ABNORMAL /HPF (ref 0–4)

## 2019-08-19 PROCEDURE — 36415 COLL VENOUS BLD VENIPUNCTURE: CPT

## 2019-08-19 PROCEDURE — 83690 ASSAY OF LIPASE: CPT

## 2019-08-19 PROCEDURE — 81001 URINALYSIS AUTO W/SCOPE: CPT

## 2019-08-19 PROCEDURE — 74011250636 HC RX REV CODE- 250/636: Performed by: EMERGENCY MEDICINE

## 2019-08-19 PROCEDURE — 85025 COMPLETE CBC W/AUTO DIFF WBC: CPT

## 2019-08-19 PROCEDURE — 99285 EMERGENCY DEPT VISIT HI MDM: CPT

## 2019-08-19 PROCEDURE — 74011636320 HC RX REV CODE- 636/320: Performed by: EMERGENCY MEDICINE

## 2019-08-19 PROCEDURE — 96374 THER/PROPH/DIAG INJ IV PUSH: CPT

## 2019-08-19 PROCEDURE — 84484 ASSAY OF TROPONIN QUANT: CPT

## 2019-08-19 PROCEDURE — 93005 ELECTROCARDIOGRAM TRACING: CPT

## 2019-08-19 PROCEDURE — 74177 CT ABD & PELVIS W/CONTRAST: CPT

## 2019-08-19 PROCEDURE — 80053 COMPREHEN METABOLIC PANEL: CPT

## 2019-08-19 RX ORDER — SODIUM CHLORIDE 9 MG/ML
50 INJECTION, SOLUTION INTRAVENOUS ONCE
Status: COMPLETED | OUTPATIENT
Start: 2019-08-19 | End: 2019-08-19

## 2019-08-19 RX ORDER — HALOPERIDOL 5 MG/ML
INJECTION INTRAMUSCULAR
Status: DISCONTINUED
Start: 2019-08-19 | End: 2019-08-19 | Stop reason: HOSPADM

## 2019-08-19 RX ORDER — SODIUM CHLORIDE 0.9 % (FLUSH) 0.9 %
10 SYRINGE (ML) INJECTION ONCE
Status: COMPLETED | OUTPATIENT
Start: 2019-08-19 | End: 2019-08-19

## 2019-08-19 RX ORDER — ONDANSETRON 4 MG/1
4 TABLET, ORALLY DISINTEGRATING ORAL
Qty: 10 TAB | Refills: 0 | Status: SHIPPED | OUTPATIENT
Start: 2019-08-19 | End: 2021-09-10 | Stop reason: SDUPTHER

## 2019-08-19 RX ORDER — HALOPERIDOL 5 MG/ML
5 INJECTION INTRAMUSCULAR
Status: COMPLETED | OUTPATIENT
Start: 2019-08-19 | End: 2019-08-19

## 2019-08-19 RX ADMIN — DIATRIZOATE MEGLUMINE AND DIATRIZOATE SODIUM 30 ML: 660; 100 LIQUID ORAL; RECTAL at 01:15

## 2019-08-19 RX ADMIN — SODIUM CHLORIDE 1000 ML: 900 INJECTION, SOLUTION INTRAVENOUS at 01:15

## 2019-08-19 RX ADMIN — SODIUM CHLORIDE 50 ML/HR: 900 INJECTION, SOLUTION INTRAVENOUS at 03:08

## 2019-08-19 RX ADMIN — IOPAMIDOL 100 ML: 755 INJECTION, SOLUTION INTRAVENOUS at 03:08

## 2019-08-19 RX ADMIN — Medication 10 ML: at 03:08

## 2019-08-19 RX ADMIN — HALOPERIDOL LACTATE 5 MG: 5 INJECTION, SOLUTION INTRAMUSCULAR at 01:15

## 2019-08-19 NOTE — ED NOTES
Patient given discharge papers and instructions by primary RN. Patient verbalized understanding and stated not having any questions or concerns regarding her care today. Patient ambulatory out of ED with .

## 2019-08-19 NOTE — DISCHARGE INSTRUCTIONS
Patient Education        Abdominal Pain: Care Instructions  Your Care Instructions    Abdominal pain has many possible causes. Some aren't serious and get better on their own in a few days. Others need more testing and treatment. If your pain continues or gets worse, you need to be rechecked and may need more tests to find out what is wrong. You may need surgery to correct the problem. Don't ignore new symptoms, such as fever, nausea and vomiting, urination problems, pain that gets worse, and dizziness. These may be signs of a more serious problem. Your doctor may have recommended a follow-up visit in the next 8 to 12 hours. If you are not getting better, you may need more tests or treatment. The doctor has checked you carefully, but problems can develop later. If you notice any problems or new symptoms, get medical treatment right away. Follow-up care is a key part of your treatment and safety. Be sure to make and go to all appointments, and call your doctor if you are having problems. It's also a good idea to know your test results and keep a list of the medicines you take. How can you care for yourself at home? · Rest until you feel better. · To prevent dehydration, drink plenty of fluids, enough so that your urine is light yellow or clear like water. Choose water and other caffeine-free clear liquids until you feel better. If you have kidney, heart, or liver disease and have to limit fluids, talk with your doctor before you increase the amount of fluids you drink. · If your stomach is upset, eat mild foods, such as rice, dry toast or crackers, bananas, and applesauce. Try eating several small meals instead of two or three large ones. · Wait until 48 hours after all symptoms have gone away before you have spicy foods, alcohol, and drinks that contain caffeine. · Do not eat foods that are high in fat. · Avoid anti-inflammatory medicines such as aspirin, ibuprofen (Advil, Motrin), and naproxen (Aleve). These can cause stomach upset. Talk to your doctor if you take daily aspirin for another health problem. When should you call for help? Call 911 anytime you think you may need emergency care. For example, call if:    · You passed out (lost consciousness).     · You pass maroon or very bloody stools.     · You vomit blood or what looks like coffee grounds.     · You have new, severe belly pain.    Call your doctor now or seek immediate medical care if:    · Your pain gets worse, especially if it becomes focused in one area of your belly.     · You have a new or higher fever.     · Your stools are black and look like tar, or they have streaks of blood.     · You have unexpected vaginal bleeding.     · You have symptoms of a urinary tract infection. These may include:  ? Pain when you urinate. ? Urinating more often than usual.  ? Blood in your urine.     · You are dizzy or lightheaded, or you feel like you may faint.    Watch closely for changes in your health, and be sure to contact your doctor if:    · You are not getting better after 1 day (24 hours). Where can you learn more? Go to http://s[employer]Button [employer] [place].info/. Enter V387 in the search box to learn more about \"Abdominal Pain: Care Instructions. \"  Current as of: September 23, 2018  Content Version: 12.1  © 2632-6688 [employer]. Care instructions adapted under license by [employer] (which disclaims liability or warranty for this information). If you have questions about a medical condition or this instruction, always ask your healthcare professional. [place] [address] any warranty or liability for your use of this information. Patient Education        Dizziness: Care Instructions  Your Care Instructions  Dizziness is the feeling of unsteadiness or fuzziness in your head. It is different than having vertigo, which is a feeling that the room is spinning or that you are moving or falling.  It is also different from lightheadedness, which is the feeling that you are about to faint. It can be hard to know what causes dizziness. Some people feel dizzy when they have migraine headaches. Sometimes bouts of flu can make you feel dizzy. Some medical conditions, such as heart problems or high blood pressure, can make you feel dizzy. Many medicines can cause dizziness, including medicines for high blood pressure, pain, or anxiety. If a medicine causes your symptoms, your doctor may recommend that you stop or change the medicine. If it is a problem with your heart, you may need medicine to help your heart work better. If there is no clear reason for your symptoms, your doctor may suggest watching and waiting for a while to see if the dizziness goes away on its own. Follow-up care is a key part of your treatment and safety. Be sure to make and go to all appointments, and call your doctor if you are having problems. It's also a good idea to know your test results and keep a list of the medicines you take. How can you care for yourself at home? · If your doctor recommends or prescribes medicine, take it exactly as directed. Call your doctor if you think you are having a problem with your medicine. · Do not drive while you feel dizzy. · Try to prevent falls. Steps you can take include:  ? Using nonskid mats, adding grab bars near the tub, and using night-lights. ? Clearing your home so that walkways are free of anything you might trip on.  ? Letting family and friends know that you have been feeling dizzy. This will help them know how to help you. When should you call for help? Call 911 anytime you think you may need emergency care. For example, call if:    · You passed out (lost consciousness).     · You have dizziness along with symptoms of a heart attack. These may include:  ? Chest pain or pressure, or a strange feeling in the chest.  ? Sweating. ? Shortness of breath. ? Nausea or vomiting.   ? Pain, pressure, or a strange feeling in the back, neck, jaw, or upper belly or in one or both shoulders or arms. ? Lightheadedness or sudden weakness. ? A fast or irregular heartbeat.     · You have symptoms of a stroke. These may include:  ? Sudden numbness, tingling, weakness, or loss of movement in your face, arm, or leg, especially on only one side of your body. ? Sudden vision changes. ? Sudden trouble speaking. ? Sudden confusion or trouble understanding simple statements. ? Sudden problems with walking or balance. ? A sudden, severe headache that is different from past headaches.    Call your doctor now or seek immediate medical care if:    · You feel dizzy and have a fever, headache, or ringing in your ears.     · You have new or increased nausea and vomiting.     · Your dizziness does not go away or comes back.    Watch closely for changes in your health, and be sure to contact your doctor if:    · You do not get better as expected. Where can you learn more? Go to http://sabi-dakota.info/. Enter X006 in the search box to learn more about \"Dizziness: Care Instructions. \"  Current as of: September 23, 2018  Content Version: 12.1  © 5071-1869 [employer]. Care instructions adapted under license by [employer] (which disclaims liability or warranty for this information). If you have questions about a medical condition or this instruction, always ask your healthcare professional. [place] [address] any warranty or liability for your use of this information. Patient Education        Nausea and Vomiting: Care Instructions  Your Care Instructions    When you are nauseated, you may feel weak and sweaty and notice a lot of saliva in your mouth. Nausea often leads to vomiting. Most of the time you do not need to worry about nausea and vomiting, but they can be signs of other illnesses.   Two common causes of nausea and vomiting are stomach flu and food poisoning. Nausea and vomiting from viral stomach flu will usually start to improve within 24 hours. Nausea and vomiting from food poisoning may last from 12 to 48 hours. The doctor has checked you carefully, but problems can develop later. If you notice any problems or new symptoms, get medical treatment right away. Follow-up care is a key part of your treatment and safety. Be sure to make and go to all appointments, and call your doctor if you are having problems. It's also a good idea to know your test results and keep a list of the medicines you take. How can you care for yourself at home? · To prevent dehydration, drink plenty of fluids, enough so that your urine is light yellow or clear like water. Choose water and other caffeine-free clear liquids until you feel better. If you have kidney, heart, or liver disease and have to limit fluids, talk with your doctor before you increase the amount of fluids you drink. · Rest in bed until you feel better. · When you are able to eat, try clear soups, mild foods, and liquids until all symptoms are gone for 12 to 48 hours. Other good choices include dry toast, crackers, cooked cereal, and gelatin dessert, such as Jell-O. When should you call for help? Call 911 anytime you think you may need emergency care. For example, call if:    · You passed out (lost consciousness).    Call your doctor now or seek immediate medical care if:    · You have symptoms of dehydration, such as:  ? Dry eyes and a dry mouth. ? Passing only a little dark urine. ?  Feeling thirstier than usual.     · You have new or worsening belly pain.     · You have a new or higher fever.     · You vomit blood or what looks like coffee grounds.    Watch closely for changes in your health, and be sure to contact your doctor if:    · You have ongoing nausea and vomiting.     · Your vomiting is getting worse.     · Your vomiting lasts longer than 2 days.     · You are not getting better as expected. Where can you learn more? Go to http://sabi-dakota.info/. Enter 25 [id number] in the search box to learn more about \"Nausea and Vomiting: Care Instructions. \"  Current as of: September 23, 2018  Content Version: 12.1  © 5668-3378 [employer]. Care instructions adapted under license by [employer] (which disclaims liability or warranty for this information). If you have questions about a medical condition or this instruction, always ask your healthcare professional. [address] any warranty or liability for your use of this information.

## 2019-08-19 NOTE — ED TRIAGE NOTES
States that prior to dinner (approx 6 hours ago) she began with abd pain and burning. She vomited 3 times. While laying down, she became dizzy and developed a headache.

## 2019-08-19 NOTE — ED PROVIDER NOTES
18-year-old white female past medical history asthma, bronchitis, fibromyalgia, seasonal allergic reaction, and an Vertigo presents with her  complaining of onset of 'fevers chills, nausea, vomiting x2, abdominal pain, and a headache/dizziness which started abruptly this evening at 730. Patient states this feels like her vertigo. Patient has she had a good day had been very active all day shopping. pt denies trauma, vison changes, numbness/ tingling,  diff swallowing, CP, SOB,  D/Cons or other current systemic complaints; Patient has that she has a history of gastric bypass in 2014 at [facility], as well as a recent esophageal dilatation secondary to GERD at [facility]. Last PO intake was dinner this evening    The history is provided by the patient and the spouse. Abdominal Pain    This is a new problem. The current episode started 3 to 5 hours ago. The problem occurs rarely. The problem has not changed since onset. The pain is associated with an unknown factor. The pain is located in the generalized abdominal region. The quality of the pain is aching, dull and burning. The pain is moderate. Associated symptoms include a fever, nausea, vomiting and headaches. Pertinent negatives include no anorexia, no belching, no diarrhea, no flatus, no hematochezia, no melena, no constipation, no dysuria, no frequency, no hematuria, no arthralgias, no myalgias, no trauma, no chest pain and no back pain. The pain is worsened by certain positions and palpation. The pain is relieved by nothing. The patient's surgical history includes appendectomy and gastric bypass. h/o GYN surgery tubal), Lumbar fusion       Past Medical History:   Diagnosis Date    Asthma     Bronchitis     Fibromyalgia     Seasonal allergic reaction        Past Surgical History:   Procedure Laterality Date    HX APPENDECTOMY      HX GASTRIC BYPASS      HX GYN      HX ORTHOPAEDIC      lumbar fusion    HX TUBAL LIGATION No family history on file. Social History     Socioeconomic History    Marital status:      Spouse name: Not on file    Number of children: Not on file    Years of education: Not on file    Highest education level: Not on file   Occupational History    Not on file   Social Needs    Financial resource strain: Not on file    Food insecurity:     Worry: Not on file     Inability: Not on file    Transportation needs:     Medical: Not on file     Non-medical: Not on file   Tobacco Use    Smoking status: Former Smoker    Smokeless tobacco: Never Used   Substance and Sexual Activity    Alcohol use: Yes     Comment: rarely    Drug use: No    Sexual activity: Not on file   Lifestyle    Physical activity:     Days per week: Not on file     Minutes per session: Not on file    Stress: Not on file   Relationships    Social connections:     Talks on phone: Not on file     Gets together: Not on file     Attends Quaker service: Not on file     Active member of club or organization: Not on file     Attends meetings of clubs or organizations: Not on file     Relationship status: Not on file    Intimate partner violence:     Fear of current or ex partner: Not on file     Emotionally abused: Not on file     Physically abused: Not on file     Forced sexual activity: Not on file   Other Topics Concern    Not on file   Social History Narrative    Not on file         ALLERGIES: Lortab [hydrocodone-acetaminophen]; Nsaids (non-steroidal anti-inflammatory drug); and Percocet [oxycodone-acetaminophen]    Review of Systems   Constitutional: Positive for chills and fever. HENT: Negative for trouble swallowing and voice change. Eyes: Negative for visual disturbance. Respiratory: Negative for cough, chest tightness and shortness of breath. Cardiovascular: Negative for chest pain. Gastrointestinal: Positive for abdominal pain, nausea and vomiting.  Negative for anorexia, constipation, diarrhea, flatus, hematochezia and melena. Genitourinary: Negative for dysuria, frequency and hematuria. Musculoskeletal: Negative for arthralgias, back pain and myalgias. Skin: Negative for rash. Neurological: Positive for dizziness and headaches. Negative for speech difficulty and weakness. All other systems reviewed and are negative. Vitals:    08/19/19 0044   BP: 121/88   Pulse: 84   Resp: 16   Temp: 98.4 °F (36.9 °C)   SpO2: 97%   Height: 5' 6\" (1.676 m)            Physical Exam   Constitutional: She is oriented to person, place, and time. She appears well-developed and well-nourished. NAD, AxOx4, speaking in complete sentences  GCS = 15   HENT:   Head: Normocephalic and atraumatic. Right Ear: External ear normal.   Left Ear: External ear normal.   Nose: Nose normal.   Eyes: Pupils are equal, round, and reactive to light. Conjunctivae and EOM are normal. Right eye exhibits no discharge. Left eye exhibits no discharge. No scleral icterus. No nystagmus     Neck: Normal range of motion. Neck supple. No JVD present. No tracheal deviation present. Cardiovascular: Normal rate, regular rhythm, normal heart sounds and intact distal pulses. Exam reveals no gallop and no friction rub. No murmur heard. Pulmonary/Chest: Effort normal and breath sounds normal. No respiratory distress. She has no wheezes. She has no rales. She exhibits no tenderness. Abdominal: Soft. Bowel sounds are normal. She exhibits no distension and no mass. There is no tenderness. There is no rebound and no guarding. No hernia. Genitourinary: No vaginal discharge found. Genitourinary Comments: Pt denies urinary/ vaginal complaints   Musculoskeletal: Normal range of motion. She exhibits no edema, tenderness or deformity. Neurological: She is alert and oriented to person, place, and time. She displays normal reflexes. No cranial nerve deficit or sensory deficit. She exhibits normal muscle tone.  Coordination normal.   pt has motor/ CV/ Sensation grossly intact to all extremities, R = L in strength;   Skin: Skin is warm and dry. Capillary refill takes less than 2 seconds. No rash noted. No erythema. No pallor. Psychiatric: She has a normal mood and affect. Her behavior is normal. Thought content normal.   Nursing note and vitals reviewed. MDM       Procedures      No chief complaint on file. 12:38 AM  The patients presenting problems have been discussed, and they are in agreement with the care plan formulated and outlined with them. I have encouraged them to ask questions as they arise throughout their visit. MEDICATIONS GIVEN:  Medications - No data to display    LABS REVIEWED:  Labs Reviewed - No data to display    RADIOLOGY RESULTS:  The following have been ordered and reviewed:  _____________________________________________________________________  _____________________________________________________________________    EKG interpretation:   Rhythm: normal sinus rhythm; and regular . Rate (approx.): 76; Axis: normal; P wave: normal; QRS interval: normal ; ST/T wave: normal; Negative acute significant segmental elevations/ unchanged compared to study dated 09/22/2018    PROCEDURES:        CONSULTATIONS:       PROGRESS NOTES:      DIAGNOSIS:    1. Dizziness    2. Abdominal pain, generalized    3. Nausea and vomiting, intractability of vomiting not specified, unspecified vomiting type        PLAN:  1- dizziness/ nausea/ V - resolved w/ haldol; tolerating PO  2 abd pain - neg ed evaluation/ ct'd due to gastric bypass history/ recent egd;         ED COURSE: The patients hospital course has been uncomplicated.       Per chart review:  8/13 patient had a PCP office visit  for chronic pain syndrome/ pain contract and rx for 120 50 mg tramadol given;     8/02 patient had EGD with balloon dilatation of esophagus with Dr. Flako Rasheed    1:10 AM  Pt agrees w/ haldol/ IVF/ CT abd pelvis  for now; awaiting results     2:17 AM  'feeling much better/ nausea gone/ dizziness gone/ drinking contrast/ I almost fell asleep';     3:19 AM  Dara Salgado's  results have been reviewed with her. She has been counseled regarding her diagnosis. She verbally conveys understanding and agreement of the signs, symptoms, diagnosis, treatment and prognosis and additionally agrees to Call/ Arrange follow up as recommended with Dr. General Mauri MD in 24 - 48 hours. She also agrees with the care-plan and conveys that all of her questions have been answered. I have also put together some discharge instructions for her that include: 1) educational information regarding their diagnosis, 2) how to care for their diagnosis at home, as well a 3) list of reasons why they would want to return to the ED prior to their follow-up appointment, should their condition change or for concerns.

## 2020-03-09 ENCOUNTER — APPOINTMENT (OUTPATIENT)
Dept: GENERAL RADIOLOGY | Age: 50
End: 2020-03-09
Attending: EMERGENCY MEDICINE
Payer: MEDICARE

## 2020-03-09 ENCOUNTER — HOSPITAL ENCOUNTER (EMERGENCY)
Age: 50
Discharge: HOME | End: 2020-03-09
Attending: EMERGENCY MEDICINE
Payer: MEDICARE

## 2020-03-09 VITALS
WEIGHT: 179.01 LBS | BODY MASS INDEX: 28.89 KG/M2 | TEMPERATURE: 98.1 F | RESPIRATION RATE: 14 BRPM | DIASTOLIC BLOOD PRESSURE: 60 MMHG | SYSTOLIC BLOOD PRESSURE: 96 MMHG | HEART RATE: 66 BPM | OXYGEN SATURATION: 98 %

## 2020-03-09 DIAGNOSIS — M54.50 ACUTE RIGHT-SIDED LOW BACK PAIN WITHOUT SCIATICA: Primary | ICD-10-CM

## 2020-03-09 PROCEDURE — 73502 X-RAY EXAM HIP UNI 2-3 VIEWS: CPT

## 2020-03-09 PROCEDURE — 99283 EMERGENCY DEPT VISIT LOW MDM: CPT

## 2020-03-09 PROCEDURE — 74011000250 HC RX REV CODE- 250: Performed by: EMERGENCY MEDICINE

## 2020-03-09 PROCEDURE — 96374 THER/PROPH/DIAG INJ IV PUSH: CPT

## 2020-03-09 PROCEDURE — 72100 X-RAY EXAM L-S SPINE 2/3 VWS: CPT

## 2020-03-09 PROCEDURE — 74011250636 HC RX REV CODE- 250/636: Performed by: EMERGENCY MEDICINE

## 2020-03-09 PROCEDURE — 74011250637 HC RX REV CODE- 250/637: Performed by: EMERGENCY MEDICINE

## 2020-03-09 RX ORDER — CYCLOBENZAPRINE HCL 10 MG
10 TABLET ORAL
Status: COMPLETED | OUTPATIENT
Start: 2020-03-09 | End: 2020-03-09

## 2020-03-09 RX ORDER — KETOROLAC TROMETHAMINE 30 MG/ML
30 INJECTION, SOLUTION INTRAMUSCULAR; INTRAVENOUS
Status: COMPLETED | OUTPATIENT
Start: 2020-03-09 | End: 2020-03-09

## 2020-03-09 RX ORDER — ACETAMINOPHEN 325 MG/1
975 TABLET ORAL
Status: COMPLETED | OUTPATIENT
Start: 2020-03-09 | End: 2020-03-09

## 2020-03-09 RX ORDER — LIDOCAINE 4 G/100G
1 PATCH TOPICAL
Status: DISCONTINUED | OUTPATIENT
Start: 2020-03-09 | End: 2020-03-09 | Stop reason: HOSPADM

## 2020-03-09 RX ADMIN — CYCLOBENZAPRINE HYDROCHLORIDE 10 MG: 10 TABLET, FILM COATED ORAL at 15:39

## 2020-03-09 RX ADMIN — KETOROLAC TROMETHAMINE 30 MG: 30 INJECTION, SOLUTION INTRAMUSCULAR at 15:39

## 2020-03-09 RX ADMIN — ACETAMINOPHEN 975 MG: 325 TABLET ORAL at 16:37

## 2020-03-09 NOTE — ED TRIAGE NOTES
Pt states that she started having right leg pain a couple of weeks ago then about 3-4 days ago she began to have right hip pain. Pt states that yesterday her lower back began to hurt. Pt has a fusion of S1, pt got the fusion about 2 years ago. Pt is slow to walk and appears in pain upon walking. Pt denies any injury.

## 2020-03-09 NOTE — ED PROVIDER NOTES
Anterior prox thigh/groin pain x 2 weeks without injury  Pain went away after flexeril last night  Took flexeril this am  Pain came back as she walked into the gym this am  Hurts to walk  Has hx of fibromyalgia  No hx of recent illness/fever        Patient has had 3 back surgeries in the past and she is worried about the hardware. No fall or trauma. Pain started in the proximal anterior thigh near the groin. It then radiated to the right buttock back to the groin, and also to the lumbosacral region centrally. No radiation of the pain down the leg. No numbness or tingling distally. It hurts to walk today. She is accompanied by her boyfriend. She called her PCP today. Appointment has been made for 3:00 on Wednesday, 2 days from now. Leg Pain    Associated symptoms include back pain. Back Pain    Associated symptoms include leg pain. Hip Injury    Associated symptoms include back pain. Past Medical History:   Diagnosis Date    Asthma     Bronchitis     Fibromyalgia     Seasonal allergic reaction        Past Surgical History:   Procedure Laterality Date    HX APPENDECTOMY      HX GASTRIC BYPASS      HX GYN      HX ORTHOPAEDIC      lumbar fusion    HX TUBAL LIGATION           History reviewed. No pertinent family history.     Social History     Socioeconomic History    Marital status:      Spouse name: Not on file    Number of children: Not on file    Years of education: Not on file    Highest education level: Not on file   Occupational History    Not on file   Social Needs    Financial resource strain: Not on file    Food insecurity:     Worry: Not on file     Inability: Not on file    Transportation needs:     Medical: Not on file     Non-medical: Not on file   Tobacco Use    Smoking status: Former Smoker    Smokeless tobacco: Never Used   Substance and Sexual Activity    Alcohol use: Yes     Comment: rarely    Drug use: No    Sexual activity: Not on file Lifestyle    Physical activity:     Days per week: Not on file     Minutes per session: Not on file    Stress: Not on file   Relationships    Social connections:     Talks on phone: Not on file     Gets together: Not on file     Attends Bahai service: Not on file     Active member of club or organization: Not on file     Attends meetings of clubs or organizations: Not on file     Relationship status: Not on file    Intimate partner violence:     Fear of current or ex partner: Not on file     Emotionally abused: Not on file     Physically abused: Not on file     Forced sexual activity: Not on file   Other Topics Concern    Not on file   Social History Narrative    Not on file         ALLERGIES: Lortab [hydrocodone-acetaminophen]; Nsaids (non-steroidal anti-inflammatory drug); and Percocet [oxycodone-acetaminophen]    Review of Systems   Musculoskeletal: Positive for back pain. All other systems reviewed and are negative. Vitals:    03/09/20 1457   BP: 129/78   Resp: 14   Temp: 98.5 °F (36.9 °C)   SpO2: 99%   Weight: 81.2 kg (179 lb 0.2 oz)            Physical Exam  Constitutional:       Appearance: Normal appearance. HENT:      Head: Normocephalic. Nose: Nose normal.      Mouth/Throat:      Mouth: Mucous membranes are moist.   Eyes:      Pupils: Pupils are equal, round, and reactive to light. Cardiovascular:      Rate and Rhythm: Normal rate and regular rhythm. Pulses: Normal pulses. Pulmonary:      Effort: Pulmonary effort is normal.      Breath sounds: Normal breath sounds. Abdominal:      Palpations: Abdomen is soft. Skin:     General: Skin is warm and dry. Neurological:      General: No focal deficit present. Mental Status: She is alert. MDM       Procedures      X-rays reviewed. Patient spasm is much better. This could be a muscular issue or even radicular. She has an appointment with her PCP in 2 days.   I reviewed her med list.  She will buy some lidocaine patches over-the-counter. She has Flexeril at home. She also has Tylenol. I am avoiding opiates at this time. I gave her some stretches to do. X-rays are reassuring. Discussed with patient and her boyfriend.

## 2020-03-09 NOTE — DISCHARGE INSTRUCTIONS
Patient Education        Learning About Relief for Back Pain  What is back tension and strain? Back strain happens when you overstretch, or pull, a muscle in your back. You may hurt your back in an accident or when you exercise or lift something. Most back pain will get better with rest and time. You can take care of yourself at home to help your back heal.  What can you do first to relieve back pain? When you first feel back pain, try these steps:  · Walk. Take a short walk (10 to 20 minutes) on a level surface (no slopes, hills, or stairs) every 2 to 3 hours. Walk only distances you can manage without pain, especially leg pain. · Relax. Find a comfortable position for rest. Some people are comfortable on the floor or a medium-firm bed with a small pillow under their head and another under their knees. Some people prefer to lie on their side with a pillow between their knees. Don't stay in one position for too long. · Try heat or ice. Try using a heating pad on a low or medium setting, or take a warm shower, for 15 to 20 minutes every 2 to 3 hours. Or you can buy single-use heat wraps that last up to 8 hours. You can also try an ice pack for 10 to 15 minutes every 2 to 3 hours. You can use an ice pack or a bag of frozen vegetables wrapped in a thin towel. There is not strong evidence that either heat or ice will help, but you can try them to see if they help. You may also want to try switching between heat and cold. · Take pain medicine exactly as directed. ¨ If the doctor gave you a prescription medicine for pain, take it as prescribed. ¨ If you are not taking a prescription pain medicine, ask your doctor if you can take an over-the-counter medicine. What else can you do? · Stretch and exercise. Exercises that increase flexibility may relieve your pain and make it easier for your muscles to keep your spine in a good, neutral position. And don't forget to keep walking. · Do self-massage.  You can use self-massage to unwind after work or school or to energize yourself in the morning. You can easily massage your feet, hands, or neck. Self-massage works best if you are in comfortable clothes and are sitting or lying in a comfortable position. Use oil or lotion to massage bare skin. · Reduce stress. Back pain can lead to a vicious [place]: Distress about the pain tenses the muscles in your back, which in turn causes more pain. Learn how to relax your mind and your muscles to lower your stress. Where can you learn more? Go to http://sabiVOZdakota.info/. Enter V188 in the search box to learn more about \"Learning About Relief for Back Pain. \"  Current as of: March 21, 2017  Content Version: 11.5  © 1971-3683 [employer]. Care instructions adapted under license by [employer] (which disclaims liability or warranty for this information). If you have questions about a medical condition or this instruction, always ask your healthcare professional. [place] [address] any warranty or liability for your use of this information. Patient Education        Acute Low Back Pain: Exercises  Introduction  Here are some examples of typical rehabilitation exercises for your condition. Start each exercise slowly. Ease off the exercise if you start to have pain. Your doctor or physical therapist will tell you when you can start these exercises and which ones will work best for you. When you are not being active, find a comfortable position for rest. Some people are comfortable on the floor or a medium-firm bed with a small pillow under their head and another under their knees. Some people prefer to lie on their side with a pillow between their knees. Don't stay in one position for too long. Take short walks (10 to 20 minutes) every 2 to 3 hours. Avoid slopes, hills, and stairs until you feel better.  Walk only distances you can manage without pain, especially leg pain.  How to do the exercises  Back stretches    1. Get down on your hands and knees on the floor. 2. Relax your head and allow it to droop. Round your back up toward the ceiling until you feel a nice stretch in your upper, middle, and lower back. Hold this stretch for as long as it feels comfortable, or about 15 to 30 seconds. 3. Return to the starting position with a flat back while you are on your hands and knees. 4. Let your back sway by pressing your stomach toward the floor. Lift your buttocks toward the ceiling. 5. Hold this position for 15 to 30 seconds. 6. Repeat 2 to 4 times. Follow-up care is a key part of your treatment and safety. Be sure to make and go to all appointments, and call your doctor if you are having problems. It's also a good idea to know your test results and keep a list of the medicines you take. Where can you learn more? Go to http://sabi-dakota.info/. Enter P589 in the search box to learn more about \"Acute Low Back Pain: Exercises. \"  Current as of: June 26, 2019  Content Version: 12.2  © 2709-2524 [employer]. Care instructions adapted under license by [employer] (which disclaims liability or warranty for this information). If you have questions about a medical condition or this instruction, always ask your healthcare professional. [address] any warranty or liability for your use of this information.

## 2020-03-12 ENCOUNTER — APPOINTMENT (OUTPATIENT)
Dept: GENERAL RADIOLOGY | Age: 50
DRG: 917 | End: 2020-03-12
Attending: EMERGENCY MEDICINE
Payer: MEDICARE

## 2020-03-12 ENCOUNTER — HOSPITAL ENCOUNTER (INPATIENT)
Age: 50
LOS: 1 days | Discharge: HOME | DRG: 917 | End: 2020-03-13
Attending: EMERGENCY MEDICINE | Admitting: INTERNAL MEDICINE
Payer: MEDICARE

## 2020-03-12 ENCOUNTER — APPOINTMENT (OUTPATIENT)
Dept: CT IMAGING | Age: 50
DRG: 917 | End: 2020-03-12
Attending: EMERGENCY MEDICINE
Payer: MEDICARE

## 2020-03-12 DIAGNOSIS — R41.0 ACUTE DELIRIUM: ICD-10-CM

## 2020-03-12 DIAGNOSIS — T50.904A DRUG OVERDOSE, UNDETERMINED INTENT, INITIAL ENCOUNTER: ICD-10-CM

## 2020-03-12 DIAGNOSIS — R41.82 ALTERED MENTAL STATUS, UNSPECIFIED ALTERED MENTAL STATUS TYPE: Primary | ICD-10-CM

## 2020-03-12 PROBLEM — T50.901A DRUG OVERDOSE: Status: ACTIVE | Noted: 2020-03-12

## 2020-03-12 LAB
ALBUMIN SERPL-MCNC: 4 G/DL (ref 3.5–5)
ALBUMIN SERPL-MCNC: 4 G/DL (ref 3.5–5)
ALBUMIN/GLOB SERPL: 0.9 {RATIO} (ref 1.1–2.2)
ALBUMIN/GLOB SERPL: 1.1 {RATIO} (ref 1.1–2.2)
ALP SERPL-CCNC: 108 U/L (ref 45–117)
ALP SERPL-CCNC: 118 U/L (ref 45–117)
ALT SERPL-CCNC: 34 U/L (ref 12–78)
ALT SERPL-CCNC: 42 U/L (ref 12–78)
AMMONIA PLAS-SCNC: 15 UMOL/L
AMPHET UR QL SCN: NEGATIVE
ANION GAP SERPL CALC-SCNC: 13 MMOL/L (ref 5–15)
ANION GAP SERPL CALC-SCNC: 6 MMOL/L (ref 5–15)
APAP SERPL-MCNC: <2 UG/ML (ref 10–30)
APAP SERPL-MCNC: <2 UG/ML (ref 10–30)
APPEARANCE UR: CLEAR
ARTERIAL PATENCY WRIST A: YES
AST SERPL-CCNC: 20 U/L (ref 15–37)
AST SERPL-CCNC: 27 U/L (ref 15–37)
ATRIAL RATE: 122 BPM
BACTERIA URNS QL MICRO: NEGATIVE /HPF
BARBITURATES UR QL SCN: NEGATIVE
BASE EXCESS BLD CALC-SCNC: 1 MMOL/L
BASOPHILS # BLD: 0 K/UL (ref 0–0.1)
BASOPHILS # BLD: 0 K/UL (ref 0–0.1)
BASOPHILS NFR BLD: 1 % (ref 0–1)
BASOPHILS NFR BLD: 1 % (ref 0–1)
BDY SITE: ABNORMAL
BENZODIAZ UR QL: NEGATIVE
BILIRUB SERPL-MCNC: 0.2 MG/DL (ref 0.2–1)
BILIRUB SERPL-MCNC: 0.3 MG/DL (ref 0.2–1)
BILIRUB UR QL: NEGATIVE
BUN SERPL-MCNC: 12 MG/DL (ref 6–20)
BUN SERPL-MCNC: 8 MG/DL (ref 6–20)
BUN/CREAT SERPL: 12 (ref 12–20)
BUN/CREAT SERPL: 16 (ref 12–20)
CALCIUM SERPL-MCNC: 9.1 MG/DL (ref 8.5–10.1)
CALCIUM SERPL-MCNC: 9.4 MG/DL (ref 8.5–10.1)
CALCULATED P AXIS, ECG09: 67 DEGREES
CALCULATED R AXIS, ECG10: 12 DEGREES
CALCULATED T AXIS, ECG11: 46 DEGREES
CANNABINOIDS UR QL SCN: NEGATIVE
CHLORIDE SERPL-SCNC: 106 MMOL/L (ref 97–108)
CHLORIDE SERPL-SCNC: 108 MMOL/L (ref 97–108)
CO2 SERPL-SCNC: 27 MMOL/L (ref 21–32)
CO2 SERPL-SCNC: 28 MMOL/L (ref 21–32)
COCAINE UR QL SCN: NEGATIVE
COLOR UR: NORMAL
COMMENT, HOLDF: NORMAL
CREAT SERPL-MCNC: 0.68 MG/DL (ref 0.55–1.02)
CREAT SERPL-MCNC: 0.77 MG/DL (ref 0.55–1.02)
DIAGNOSIS, 93000: NORMAL
DIFFERENTIAL METHOD BLD: ABNORMAL
DIFFERENTIAL METHOD BLD: ABNORMAL
DRUG SCRN COMMENT,DRGCM: NORMAL
EOSINOPHIL # BLD: 0.1 K/UL (ref 0–0.4)
EOSINOPHIL # BLD: 0.1 K/UL (ref 0–0.4)
EOSINOPHIL NFR BLD: 1 % (ref 0–7)
EOSINOPHIL NFR BLD: 2 % (ref 0–7)
EPITH CASTS URNS QL MICRO: NORMAL /LPF
ERYTHROCYTE [DISTWIDTH] IN BLOOD BY AUTOMATED COUNT: 13.2 % (ref 11.5–14.5)
ERYTHROCYTE [DISTWIDTH] IN BLOOD BY AUTOMATED COUNT: 13.7 % (ref 11.5–14.5)
ETHANOL SERPL-MCNC: 33 MG/DL
GAS FLOW.O2 O2 DELIVERY SYS: ABNORMAL L/MIN
GLOBULIN SER CALC-MCNC: 3.8 G/DL (ref 2–4)
GLOBULIN SER CALC-MCNC: 4.3 G/DL (ref 2–4)
GLUCOSE SERPL-MCNC: 107 MG/DL (ref 65–100)
GLUCOSE SERPL-MCNC: 110 MG/DL (ref 65–100)
GLUCOSE UR STRIP.AUTO-MCNC: NEGATIVE MG/DL
HCO3 BLD-SCNC: 26.2 MMOL/L (ref 22–26)
HCT VFR BLD AUTO: 40.1 % (ref 35–47)
HCT VFR BLD AUTO: 42.8 % (ref 35–47)
HGB BLD-MCNC: 12.1 G/DL (ref 11.5–16)
HGB BLD-MCNC: 12.8 G/DL (ref 11.5–16)
HGB UR QL STRIP: NEGATIVE
IMM GRANULOCYTES # BLD AUTO: 0 K/UL (ref 0–0.04)
IMM GRANULOCYTES # BLD AUTO: 0 K/UL (ref 0–0.04)
IMM GRANULOCYTES NFR BLD AUTO: 0 % (ref 0–0.5)
IMM GRANULOCYTES NFR BLD AUTO: 0 % (ref 0–0.5)
KETONES UR QL STRIP.AUTO: NEGATIVE MG/DL
LEUKOCYTE ESTERASE UR QL STRIP.AUTO: NEGATIVE
LYMPHOCYTES # BLD: 1.5 K/UL (ref 0.8–3.5)
LYMPHOCYTES # BLD: 1.6 K/UL (ref 0.8–3.5)
LYMPHOCYTES NFR BLD: 22 % (ref 12–49)
LYMPHOCYTES NFR BLD: 34 % (ref 12–49)
MAGNESIUM SERPL-MCNC: 1.9 MG/DL (ref 1.6–2.4)
MAGNESIUM SERPL-MCNC: 2.2 MG/DL (ref 1.6–2.4)
MCH RBC QN AUTO: 25.7 PG (ref 26–34)
MCH RBC QN AUTO: 25.7 PG (ref 26–34)
MCHC RBC AUTO-ENTMCNC: 29.9 G/DL (ref 30–36.5)
MCHC RBC AUTO-ENTMCNC: 30.2 G/DL (ref 30–36.5)
MCV RBC AUTO: 85.1 FL (ref 80–99)
MCV RBC AUTO: 85.9 FL (ref 80–99)
METHADONE UR QL: NEGATIVE
MONOCYTES # BLD: 0.4 K/UL (ref 0–1)
MONOCYTES # BLD: 0.6 K/UL (ref 0–1)
MONOCYTES NFR BLD: 8 % (ref 5–13)
MONOCYTES NFR BLD: 8 % (ref 5–13)
NEUTS SEG # BLD: 2.5 K/UL (ref 1.8–8)
NEUTS SEG # BLD: 4.6 K/UL (ref 1.8–8)
NEUTS SEG NFR BLD: 55 % (ref 32–75)
NEUTS SEG NFR BLD: 68 % (ref 32–75)
NITRITE UR QL STRIP.AUTO: NEGATIVE
NRBC # BLD: 0 K/UL (ref 0–0.01)
NRBC # BLD: 0 K/UL (ref 0–0.01)
NRBC BLD-RTO: 0 PER 100 WBC
NRBC BLD-RTO: 0 PER 100 WBC
O2/TOTAL GAS SETTING VFR VENT: 97 %
OPIATES UR QL: NEGATIVE
P-R INTERVAL, ECG05: 136 MS
PCO2 BLD: 42.2 MMHG (ref 35–45)
PCP UR QL: NEGATIVE
PH BLD: 7.4 [PH] (ref 7.35–7.45)
PH UR STRIP: 6.5 [PH] (ref 5–8)
PHOSPHATE SERPL-MCNC: 3.2 MG/DL (ref 2.6–4.7)
PHOSPHATE SERPL-MCNC: 3.8 MG/DL (ref 2.6–4.7)
PLATELET # BLD AUTO: 231 K/UL (ref 150–400)
PLATELET # BLD AUTO: 247 K/UL (ref 150–400)
PMV BLD AUTO: 8.7 FL (ref 8.9–12.9)
PMV BLD AUTO: 8.8 FL (ref 8.9–12.9)
PO2 BLD: 76 MMHG (ref 80–100)
POTASSIUM SERPL-SCNC: 3.7 MMOL/L (ref 3.5–5.1)
POTASSIUM SERPL-SCNC: 4 MMOL/L (ref 3.5–5.1)
PROT SERPL-MCNC: 7.8 G/DL (ref 6.4–8.2)
PROT SERPL-MCNC: 8.3 G/DL (ref 6.4–8.2)
PROT UR STRIP-MCNC: NEGATIVE MG/DL
Q-T INTERVAL, ECG07: 312 MS
QRS DURATION, ECG06: 74 MS
QTC CALCULATION (BEZET), ECG08: 444 MS
RBC # BLD AUTO: 4.71 M/UL (ref 3.8–5.2)
RBC # BLD AUTO: 4.98 M/UL (ref 3.8–5.2)
RBC #/AREA URNS HPF: NORMAL /HPF (ref 0–5)
SALICYLATES SERPL-MCNC: <1.7 MG/DL (ref 2.8–20)
SALICYLATES SERPL-MCNC: <1.7 MG/DL (ref 2.8–20)
SAMPLES BEING HELD,HOLD: NORMAL
SAO2 % BLD: 95 % (ref 92–97)
SODIUM SERPL-SCNC: 141 MMOL/L (ref 136–145)
SODIUM SERPL-SCNC: 147 MMOL/L (ref 136–145)
SP GR UR REFRACTOMETRY: 1.01 (ref 1–1.03)
SPECIMEN TYPE: ABNORMAL
TOTAL RESP. RATE, ITRR: 20
UA: UC IF INDICATED,UAUC: NORMAL
UROBILINOGEN UR QL STRIP.AUTO: 0.2 EU/DL (ref 0.2–1)
VENTRICULAR RATE, ECG03: 122 BPM
WBC # BLD AUTO: 4.6 K/UL (ref 3.6–11)
WBC # BLD AUTO: 6.8 K/UL (ref 3.6–11)
WBC URNS QL MICRO: NORMAL /HPF (ref 0–4)

## 2020-03-12 PROCEDURE — 93005 ELECTROCARDIOGRAM TRACING: CPT

## 2020-03-12 PROCEDURE — 84100 ASSAY OF PHOSPHORUS: CPT

## 2020-03-12 PROCEDURE — 74011250636 HC RX REV CODE- 250/636: Performed by: EMERGENCY MEDICINE

## 2020-03-12 PROCEDURE — 82803 BLOOD GASES ANY COMBINATION: CPT

## 2020-03-12 PROCEDURE — 74011636320 HC RX REV CODE- 636/320: Performed by: EMERGENCY MEDICINE

## 2020-03-12 PROCEDURE — 80053 COMPREHEN METABOLIC PANEL: CPT

## 2020-03-12 PROCEDURE — 83735 ASSAY OF MAGNESIUM: CPT

## 2020-03-12 PROCEDURE — 85025 COMPLETE CBC W/AUTO DIFF WBC: CPT

## 2020-03-12 PROCEDURE — 70450 CT HEAD/BRAIN W/O DYE: CPT

## 2020-03-12 PROCEDURE — 71045 X-RAY EXAM CHEST 1 VIEW: CPT

## 2020-03-12 PROCEDURE — 36415 COLL VENOUS BLD VENIPUNCTURE: CPT

## 2020-03-12 PROCEDURE — 36600 WITHDRAWAL OF ARTERIAL BLOOD: CPT

## 2020-03-12 PROCEDURE — 99285 EMERGENCY DEPT VISIT HI MDM: CPT

## 2020-03-12 PROCEDURE — 74011250636 HC RX REV CODE- 250/636: Performed by: INTERNAL MEDICINE

## 2020-03-12 PROCEDURE — 96374 THER/PROPH/DIAG INJ IV PUSH: CPT

## 2020-03-12 PROCEDURE — 81001 URINALYSIS AUTO W/SCOPE: CPT

## 2020-03-12 PROCEDURE — 96361 HYDRATE IV INFUSION ADD-ON: CPT

## 2020-03-12 PROCEDURE — 96375 TX/PRO/DX INJ NEW DRUG ADDON: CPT

## 2020-03-12 PROCEDURE — 65660000001 HC RM ICU INTERMED STEPDOWN

## 2020-03-12 PROCEDURE — 51701 INSERT BLADDER CATHETER: CPT

## 2020-03-12 PROCEDURE — 94762 N-INVAS EAR/PLS OXIMTRY CONT: CPT

## 2020-03-12 PROCEDURE — 82140 ASSAY OF AMMONIA: CPT

## 2020-03-12 PROCEDURE — 80307 DRUG TEST PRSMV CHEM ANLYZR: CPT

## 2020-03-12 PROCEDURE — 70496 CT ANGIOGRAPHY HEAD: CPT

## 2020-03-12 RX ORDER — SODIUM CHLORIDE 9 MG/ML
125 INJECTION, SOLUTION INTRAVENOUS CONTINUOUS
Status: DISCONTINUED | OUTPATIENT
Start: 2020-03-12 | End: 2020-03-13 | Stop reason: HOSPADM

## 2020-03-12 RX ORDER — NALOXONE HYDROCHLORIDE 1 MG/ML
2 INJECTION INTRAMUSCULAR; INTRAVENOUS; SUBCUTANEOUS
Status: COMPLETED | OUTPATIENT
Start: 2020-03-12 | End: 2020-03-12

## 2020-03-12 RX ORDER — SODIUM CHLORIDE 0.9 % (FLUSH) 0.9 %
5-40 SYRINGE (ML) INJECTION AS NEEDED
Status: DISCONTINUED | OUTPATIENT
Start: 2020-03-12 | End: 2020-03-13 | Stop reason: HOSPADM

## 2020-03-12 RX ORDER — LORAZEPAM 2 MG/ML
INJECTION INTRAMUSCULAR
Status: DISPENSED
Start: 2020-03-12 | End: 2020-03-12

## 2020-03-12 RX ORDER — SODIUM CHLORIDE 9 MG/ML
50 INJECTION, SOLUTION INTRAVENOUS ONCE
Status: COMPLETED | OUTPATIENT
Start: 2020-03-12 | End: 2020-03-12

## 2020-03-12 RX ORDER — SODIUM CHLORIDE 0.9 % (FLUSH) 0.9 %
10 SYRINGE (ML) INJECTION
Status: COMPLETED | OUTPATIENT
Start: 2020-03-12 | End: 2020-03-12

## 2020-03-12 RX ORDER — LORAZEPAM 2 MG/ML
2 INJECTION INTRAMUSCULAR ONCE
Status: DISCONTINUED | OUTPATIENT
Start: 2020-03-12 | End: 2020-03-12

## 2020-03-12 RX ORDER — NALOXONE HYDROCHLORIDE 1 MG/ML
INJECTION INTRAMUSCULAR; INTRAVENOUS; SUBCUTANEOUS
Status: DISPENSED
Start: 2020-03-12 | End: 2020-03-12

## 2020-03-12 RX ORDER — NALOXONE HYDROCHLORIDE 1 MG/ML
2 INJECTION INTRAMUSCULAR; INTRAVENOUS; SUBCUTANEOUS
Status: DISCONTINUED | OUTPATIENT
Start: 2020-03-12 | End: 2020-03-12 | Stop reason: SDUPTHER

## 2020-03-12 RX ORDER — SODIUM CHLORIDE 0.9 % (FLUSH) 0.9 %
5-40 SYRINGE (ML) INJECTION EVERY 8 HOURS
Status: DISCONTINUED | OUTPATIENT
Start: 2020-03-12 | End: 2020-03-13 | Stop reason: HOSPADM

## 2020-03-12 RX ORDER — LORAZEPAM 2 MG/ML
2 INJECTION INTRAMUSCULAR
Status: COMPLETED | OUTPATIENT
Start: 2020-03-12 | End: 2020-03-12

## 2020-03-12 RX ORDER — LORAZEPAM 2 MG/ML
2 INJECTION INTRAMUSCULAR
Status: DISCONTINUED | OUTPATIENT
Start: 2020-03-12 | End: 2020-03-12

## 2020-03-12 RX ORDER — HALOPERIDOL 5 MG/ML
5 INJECTION INTRAMUSCULAR
Status: DISCONTINUED | OUTPATIENT
Start: 2020-03-12 | End: 2020-03-13

## 2020-03-12 RX ADMIN — SODIUM CHLORIDE 125 ML/HR: 900 INJECTION, SOLUTION INTRAVENOUS at 20:42

## 2020-03-12 RX ADMIN — LORAZEPAM 2 MG: 2 INJECTION, SOLUTION INTRAMUSCULAR; INTRAVENOUS at 05:18

## 2020-03-12 RX ADMIN — NALOXONE HYDROCHLORIDE 2 MG: 1 INJECTION PARENTERAL at 04:48

## 2020-03-12 RX ADMIN — Medication 10 ML: at 20:45

## 2020-03-12 RX ADMIN — IOPAMIDOL 100 ML: 755 INJECTION, SOLUTION INTRAVENOUS at 07:28

## 2020-03-12 RX ADMIN — Medication 10 ML: at 07:28

## 2020-03-12 RX ADMIN — SODIUM CHLORIDE 1000 ML: 900 INJECTION, SOLUTION INTRAVENOUS at 04:55

## 2020-03-12 RX ADMIN — LORAZEPAM 2 MG: 2 INJECTION, SOLUTION INTRAMUSCULAR; INTRAVENOUS at 05:27

## 2020-03-12 RX ADMIN — SODIUM CHLORIDE 50 ML/HR: 900 INJECTION, SOLUTION INTRAVENOUS at 07:00

## 2020-03-12 NOTE — ED PROVIDER NOTES
The patient is a 49-year-old female with a past medical history significant for asthma, bronchitis, fibromyalgia, seasonal allergic reaction, status post appendectomy, gastric bypass, lumbar fusion and recent diagnosis of low back pain who presents to the ED with her  for evaluation for altered mental status and delirium.  states that the patient probably took too many of her Flexeril as she became acutely drowsy and difficult to arouse. Last time known well was approximately midnight   As the patient was in her usual state of health. The patient is usually alert, oriented x4, ambulates without difficulty and take care of ADLs.  noted that the patient was increasingly confused, lethargic and difficult to arouse and speaking incomprehensible. He was able to carry her to the car and drove approximately 20 to 25 minutes to the ER for further evaluation. Patient is unable to answer my question at this time. Most of the history was obtained from the . Past Medical History:   Diagnosis Date    Asthma     Bronchitis     Fibromyalgia     Seasonal allergic reaction        Past Surgical History:   Procedure Laterality Date    HX APPENDECTOMY      HX GASTRIC BYPASS      HX GYN      HX ORTHOPAEDIC      lumbar fusion    HX TUBAL LIGATION           History reviewed. No pertinent family history.     Social History     Socioeconomic History    Marital status:      Spouse name: Not on file    Number of children: Not on file    Years of education: Not on file    Highest education level: Not on file   Occupational History    Not on file   Social Needs    Financial resource strain: Not on file    Food insecurity     Worry: Not on file     Inability: Not on file    Transportation needs     Medical: Not on file     Non-medical: Not on file   Tobacco Use    Smoking status: Former Smoker    Smokeless tobacco: Never Used   Substance and Sexual Activity    Alcohol use: Yes     Comment: rarely    Drug use: Yes     Types: Prescription    Sexual activity: Not on file   Lifestyle    Physical activity     Days per week: Not on file     Minutes per session: Not on file    Stress: Not on file   Relationships    Social connections     Talks on phone: Not on file     Gets together: Not on file     Attends Mosque service: Not on file     Active member of club or organization: Not on file     Attends meetings of clubs or organizations: Not on file     Relationship status: Not on file    Intimate partner violence     Fear of current or ex partner: Not on file     Emotionally abused: Not on file     Physically abused: Not on file     Forced sexual activity: Not on file   Other Topics Concern    Not on file   Social History Narrative    Not on file         ALLERGIES: Lortab [hydrocodone-acetaminophen]; Nsaids (non-steroidal anti-inflammatory drug); and Percocet [oxycodone-acetaminophen]    Review of Systems   Unable to perform ROS: Acuity of condition       Vitals:    03/12/20 0452   BP: (!) 171/113   Pulse: (!) 124   Resp: (!) 37   SpO2: 97%   Weight: 79.4 kg (175 lb 0.7 oz)   Height: 5' 6\" (1.676 m)            Physical Exam  Vitals signs and nursing note reviewed. Exam conducted with a chaperone present. CONSTITUTIONAL: Well-appearing; well-nourished; in mild distress; restless and combative and moving uncontrollably  HEAD: Normocephalic; atraumatic  EYES: Pinpoint pupil 2 mm round bilaterally; PERRL; EOM intact; conjunctiva and sclera are clear bilaterally. ENT: No rhinorrhea; normal pharynx with no tonsillar hypertrophy; mucous membranes pink/moist, no erythema, no exudate. NECK: Supple; non-tender; no cervical lymphadenopathy  CARD: Normal S1, S2; no murmurs, rubs, or gallops. Increased regular rate and rhythm. RESP: Normal respiratory effort; breath sounds clear and equal bilaterally; no wheezes, rhonchi, or rales.   ABD: Normal bowel sounds; non-distended; non-tender; no palpable organomegaly, no masses, no bruits. Back Exam: Normal inspection; no vertebral point tenderness, no CVA tenderness. Normal range of motion. EXT: Normal ROM in all four extremities; non-tender to palpation; no swelling or deformity; distal pulses are normal, no edema. SKIN: Warm; dry; no rash. NEURO: Lethargic but arousable to painful stimuli; GCS of 8; sensory and motor are nonfocal        MDM  Number of Diagnoses or Management Options  Acute delirium:   Altered mental status, unspecified altered mental status type:   Drug overdose, undetermined intent, initial encounter:   Diagnosis management comments: Assessment: 17-year-old female who presents to the ED for evaluation for altered mental status with alleged overdose on Flexeril. The patient  is a very limited historian but reports a possible concern for Flexeril overdose. The patient is unable to give any history at this time. Differential diagnosis include CVA/serotonin syndrome/metabolic encephalopathy/toxic/ACS and electrolyte abnormality. Plan: Lab/EKG [facility] x-ray/CT scan of the head/benzodiazepine for sedation/IV fluid/serial exam/ Monitor and Reevaluate.          Amount and/or Complexity of Data Reviewed  Clinical lab tests: ordered and reviewed  Tests in the radiology section of CPT®: ordered and reviewed  Tests in the medicine section of CPT®: reviewed and ordered  Discussion of test results with the performing providers: yes  Decide to obtain previous medical records or to obtain history from someone other than the patient: yes  Obtain history from someone other than the patient: yes  Review and summarize past medical records: yes  Discuss the patient with other providers: yes  Independent visualization of images, tracings, or specimens: yes    Risk of Complications, Morbidity, and/or Mortality  Presenting problems: moderate  Diagnostic procedures: moderate  Management options: moderate    Critical Care  Total time providing critical care: (Total critical care time spent exclusive of procedures: 45 minutes)    Patient Progress  Patient progress: stable         Procedures    ED EKG interpretation:  Rhythm: sinus tachycardia; and regular . Rate (approx.): 122; Axis: normal; P wave: normal; QRS interval: normal ; ST/T wave: non-specific changes; in  Lead: Diffusely; Other findings: abnormal ekg. This EKG was interpreted by Sharon Cooley MD,ED Provider. XRAY INTERPRETATION (ED MD)  Chest Xray  No acute process seen. Normal heart size. No bony abnormalities. No infiltrate. Migdalia Dobbins MD 5:15 AM    PROGRESS NOTE:  Poison control was notified and recommended supportive care at this time. The patient has symptoms consistent with anticholinergic syndrome. Written by Sharon Cooley MD,  5:59 AM    PROGRESS NOTE:  Pt has been reexamined by Migdalia Dobbins MD all available results have been reviewed with pt and any available family.  understands sx, dx, and tx in ED. Care plan has been outlined and questions have been answered. Pt and any available family understands and agrees to need for admission to hospital for further tx not available in ED. Pt is ready for admission. Written by Sharon Cooley MD,  5:59 AM    Hospitalist Perfect Serve for Admission  6:37 AM    ED Room Number: SER08/08  Patient Name and age:  Dannielle Cloud 44 y.o.  female  Working Diagnosis:   1. Altered mental status, unspecified altered mental status type    2. Drug overdose, undetermined intent, initial encounter      Readmission: no  Isolation Requirements:  no  Recommended Level of Care: IMCU  Code Status:  Full Code  Department:[facility] Adult ED - 21   Other: The patient is hemodynamically stable but mild to moderately hypertensive. CONSULT NOTE:  Migdalia Dobbins MD spoke with Dr. Syl Guillen of the adult hospitalist team. Discussed patient's presentation, history, physical assessment, and available diagnostic results.  He will evaluate, write orders and admit the patient to the hospital. 6:37 AM    7:36 AM  Received signout from Dr. Noah Russell. Pt awaiting transport to [facility] ED.   Laney Allen MD      .

## 2020-03-12 NOTE — H&P
[address] [facility] Adult  Hospitalist Group  History and Physical    Primary Care Provider: Iesha Acosta MD  Date of Service:  3/12/2020    CC: altered mental status     Subjective:     44year old female with past medical history fibromyalgia, bipolar disease, presenting to [facility] emergency department with altered mental status. Patient currently encephalopathic and history is obtained from significant other at bedside. Per significant other, patient was lethargic this morning and he brought the patient to the emergency department for further evaluation. Patient was drinking last night and partner assumes she incorrectly took her home Flexeril. He does not know amount that was taken. In the ED, patient tachycardic, tachypneic, elevated blood pressure. Narcan given without improvement. CT head negative. UDS negative. Acetaminophen and salicylate level normal. Transferred to [place] for further care. During my encounter, patient able to state name and year. Not oriented to place. Lethargic and falls asleep during encounter. Review of Systems:    Unable to obtain due to mental status    Past Medical History:   Diagnosis Date    Asthma     Bronchitis     Fibromyalgia     Seasonal allergic reaction       Past Surgical History:   Procedure Laterality Date    HX APPENDECTOMY      HX GASTRIC BYPASS      HX GYN      HX ORTHOPAEDIC      lumbar fusion    HX TUBAL LIGATION       Prior to Admission medications    Medication Sig Start Date End Date Taking? Authorizing Provider   cyclobenzaprine HCl (FLEXERIL PO) Take  by mouth. Kerwin Webber MD   ondansetron (ZOFRAN ODT) 4 mg disintegrating tablet Take 1 Tab by mouth every eight (8) hours as needed for Nausea. 8/19/19   Ramirez Alonso MD   esomeprazole (NEXIUM) 40 mg capsule Take 40 mg by mouth daily. Kerwin Webber MD   ARIPiprazole (ABILIFY) 5 mg tablet Take 5 mg by mouth daily.     Kerwin Webber MD   clonazePAM (KLONOPIN) 0.5 mg tablet Take 4 mg by mouth daily. Indications: BART ASSOCIATED WITH BIPOLAR DISORDER, ADJUNCT    Other, MD Kerwin     Allergies   Allergen Reactions    Lortab [Hydrocodone-Acetaminophen] Nausea Only and Other (comments)     No allergy per pt    Nsaids (Non-Steroidal Anti-Inflammatory Drug) Other (comments)     Has had weight loss surgery, will give ulcer    Percocet [Oxycodone-Acetaminophen] Nausea and Vomiting      History reviewed. No pertinent family history. SOCIAL HISTORY:  Patient resides at Home. Patient ambulates with independence. Smoking history: Denies  Alcohol history: occasional, denies daily use        Objective:       Physical Exam:   Visit Vitals  BP (!) 149/98 (BP 1 Location: Left arm, BP Patient Position: At rest)   Pulse 97   Temp 98.3 °F (36.8 °C)   Resp 14   Ht 5' 6\" (1.676 m)   Wt 77.8 kg (171 lb 8.3 oz)   SpO2 100%   BMI 27.68 kg/m²     General appearance: lethargic, falls asleep easily   Head: Normocephalic, without obvious abnormality, atraumatic  Lungs: clear to auscultation bilaterally  Heart: regular rate and rhythm, S1, S2 normal, no murmur, click, rub or gallop  Abdomen: soft, non-tender. Bowel sounds normal.   Extremities: extremities normal, atraumatic, no cyanosis or edema  Pulses: 2+ and symmetric  Skin: Skin color, texture, turgor normal. No rashes or lesions  Neurologic: alert to name and year, moves all extremities   Cap refill: Brisk, less than 3 seconds  Pulses: 2+, symmetric in all extremities    ECG:  Sinus tachycardia     Data Review: All diagnostic labs and studies have been reviewed. CT head:  IMPRESSION: No acute findings. Assessment:     Active Problems:    Drug overdose (3/12/2020)      Altered mental status (3/12/2020)        Plan:     Unintentional overdose:   -suspect secondary to flexeril  -negative UDS, salicylate, acetaminophen  -Admit for supportive care  -Poison control contacted.   Will repeat labs  -Hold home medications  -N.p.o. pending patient improvement  -Maintenance fluids    Fibromyalgia:  -Hold home medications  Bipolar disease:  -Initiate home Abilify when appropriate    CODE: full  DVT        Signed By: Biju Whitney,      March 12, 2020

## 2020-03-12 NOTE — PROGRESS NOTES
Patient found out of bed, stating she had to pee. RN ambulated patient across the [place] to the bathroom.

## 2020-03-12 NOTE — PROGRESS NOTES
CODE [place] altered. Patient walking around room, unbalanced, high risk for falls. Remains disoriented. Able to calm patient. Haldol prn as needed. Discussed with significant other.          [place] , DO

## 2020-03-12 NOTE — PROGRESS NOTES
Reason for Admission:   Altered mental status, unspecified altered mental status type                    RUR Score:       9              Plan for utilizing home health:      Not at this time    PCP: Yes First and Last name:  Woody Banks. Sandy Arvizu MD   Name of Practice: [facility]   Are you a current patient: Yes/No: Yes   Approximate date of last visit: March 11, 2020                    Current Advanced Directive/Advance Care Plan: Full code with no advance care plan on file at this time. Will address at a later time. Transition of Care Plan:  Anticipated ALIS plan home with family assistance. 44year old female with AMS. CM completed initial assessment with patient's Mario Alberto Clements [phone]. Resides with SO.  PTA independent with ADL's and IADL's. No DME utilized. Receives SS Disability as source of income. Insurance verified: Medicare A&B/ Medicaid. [address] is utilized for prescriptions. Family able to assist with patient transport. Care Management Interventions  PCP Verified by CM: Yes  Last Visit to PCP: 03/11/20  Palliative Care Criteria Met (RRAT>21 & CHF Dx)?: No  Mode of Transport at Discharge:  Other (see comment)(Family)  Transition of Care Consult (CM Consult): (No current CM consults or needs at this time)  Discharge Durable Medical Equipment: No  Physical Therapy Consult: No  Occupational Therapy Consult: No  Confirm Follow Up Transport: Family  Discharge Location  Discharge Placement: Home with family assistance(TBD/subject to change pending recommendations)    CHIP Padilla/STEPH  Care Management  10:57 AM

## 2020-03-12 NOTE — ED NOTES
TRANSFER - OUT REPORT:    Verbal report given to Nic Shrestha RN(name) on Noah Cruz  being transferred to [address] [place] [address] for routine progression of care       Report consisted of patients Situation, Background, Assessment and   Recommendations(SBAR). Information from the following report(s) SBAR, Kardex, ED Summary, [facility] and Recent Results was reviewed with the receiving nurse. Lines:   Peripheral IV 03/12/20 Left Antecubital (Active)   Site Assessment Clean, dry, & intact 3/12/2020  4:52 AM   Phlebitis Assessment 0 3/12/2020  4:52 AM   Infiltration Assessment 0 3/12/2020  4:52 AM   Dressing Status Clean, dry, & intact 3/12/2020  4:52 AM   Dressing Type Transparent;Tape 3/12/2020  4:52 AM   Hub Color/Line Status Pink;Patent; Flushed 3/12/2020  4:52 AM   Action Taken Blood drawn 3/12/2020  4:52 AM       Peripheral IV 03/12/20 Left Hand (Active)   Site Assessment Clean, dry, & intact 3/12/2020  5:40 AM   Phlebitis Assessment 0 3/12/2020  5:40 AM   Infiltration Assessment 0 3/12/2020  5:40 AM   Dressing Status Clean, dry, & intact 3/12/2020  5:40 AM   Dressing Type Tape;Transparent 3/12/2020  5:40 AM   Hub Color/Line Status Pink;Flushed;Patent 3/12/2020  5:40 AM        Opportunity for questions and clarification was provided.       Patient transported with:   Monitor   [facility]

## 2020-03-12 NOTE — ED TRIAGE NOTES
Pt arrived to ED with altered mental status accompanied by significant other via private vehicle. Significant other reports that Debbie Manuel thinks that she may have taken too many of her muscle relaxers or medications for fibromyalgia but he is uncertain. \" Pt is unresponsive and restless in ED bed upon arrival to ED. Significant other reports that \"they had been fighting earlier tonight and he does not think that she would try to hurt herself intentionally but prior to coming in she had become hard to arose and mentioned possibly taking too much of her medication and then got progressively worse until he carried her to the car and brought her here. \"

## 2020-03-12 NOTE — PROGRESS NOTES
TRANSFER - IN REPORT:    Verbal report received from Celia Dewitt RN(name) on [place]  being received from ED(unit) for routine progression of care      Report consisted of patients Situation, Background, Assessment and   Recommendations(SBAR). Information from the following report(s) SBAR, Kardex, ED Summary, Intake/Output, MAR, Recent Results and Cardiac Rhythm NSR/ST was reviewed with the receiving nurse. Opportunity for questions and clarification was provided. Assessment completed upon patients arrival to unit and care assumed. Primary Nurse Lara Vela and Adán Garduno RN performed a dual skin assessment on this patient No impairment noted  Andi score is 17    Pt refusing to stay in bed, high fall risk, and unsteady on feet. Sitter at bedside. Pt agitated, aggressive, and not listening to safety commands. Code [place] called. Bedside shift change report given to Cailin Dorado RN (oncoming nurse) by Lázaro Alvarado RN (offgoing nurse). Report included the following information SBAR, Kardex, Intake/Output, MAR, Recent Results and Cardiac Rhythm NSR/ST.

## 2020-03-13 VITALS
RESPIRATION RATE: 23 BRPM | WEIGHT: 172.84 LBS | HEIGHT: 66 IN | HEART RATE: 99 BPM | TEMPERATURE: 98.7 F | DIASTOLIC BLOOD PRESSURE: 84 MMHG | BODY MASS INDEX: 27.78 KG/M2 | OXYGEN SATURATION: 100 % | SYSTOLIC BLOOD PRESSURE: 155 MMHG

## 2020-03-13 PROBLEM — T50.901A OD (OVERDOSE OF DRUG), ACCIDENTAL OR UNINTENTIONAL, INITIAL ENCOUNTER: Status: ACTIVE | Noted: 2020-03-13

## 2020-03-13 PROCEDURE — 74011250637 HC RX REV CODE- 250/637: Performed by: INTERNAL MEDICINE

## 2020-03-13 PROCEDURE — 74011250636 HC RX REV CODE- 250/636: Performed by: INTERNAL MEDICINE

## 2020-03-13 PROCEDURE — 74011250637 HC RX REV CODE- 250/637: Performed by: NURSE PRACTITIONER

## 2020-03-13 RX ORDER — CLONAZEPAM 0.5 MG/1
0.5 TABLET ORAL
Status: DISCONTINUED | OUTPATIENT
Start: 2020-03-13 | End: 2020-03-13 | Stop reason: HOSPADM

## 2020-03-13 RX ORDER — ARIPIPRAZOLE 10 MG/1
10 TABLET ORAL DAILY
Status: DISCONTINUED | OUTPATIENT
Start: 2020-03-13 | End: 2020-03-13 | Stop reason: HOSPADM

## 2020-03-13 RX ORDER — ACETAMINOPHEN 325 MG/1
650 TABLET ORAL
Status: DISCONTINUED | OUTPATIENT
Start: 2020-03-13 | End: 2020-03-13 | Stop reason: HOSPADM

## 2020-03-13 RX ORDER — ACETAMINOPHEN 325 MG/1
650 TABLET ORAL ONCE
Status: COMPLETED | OUTPATIENT
Start: 2020-03-13 | End: 2020-03-13

## 2020-03-13 RX ORDER — LORAZEPAM 2 MG/ML
1 INJECTION INTRAMUSCULAR
Status: DISCONTINUED | OUTPATIENT
Start: 2020-03-13 | End: 2020-03-13 | Stop reason: HOSPADM

## 2020-03-13 RX ADMIN — Medication 10 ML: at 13:33

## 2020-03-13 RX ADMIN — ACETAMINOPHEN 650 MG: 325 TABLET ORAL at 03:47

## 2020-03-13 RX ADMIN — CLONAZEPAM 0.5 MG: 0.5 TABLET ORAL at 09:45

## 2020-03-13 RX ADMIN — SODIUM CHLORIDE 125 ML/HR: 900 INJECTION, SOLUTION INTRAVENOUS at 05:54

## 2020-03-13 RX ADMIN — Medication 10 ML: at 06:00

## 2020-03-13 RX ADMIN — ARIPIPRAZOLE 10 MG: 10 TABLET ORAL at 09:45

## 2020-03-13 RX ADMIN — LORAZEPAM 1 MG: 2 INJECTION INTRAMUSCULAR; INTRAVENOUS at 13:33

## 2020-03-13 RX ADMIN — ACETAMINOPHEN 650 MG: 325 TABLET ORAL at 15:33

## 2020-03-13 NOTE — DISCHARGE SUMMARY
Discharge Summary       PATIENT ID: Nivia Lane  MRN: [id number]   YOB: 1970    DATE OF ADMISSION: 3/12/2020  4:43 AM    DATE OF DISCHARGE: 3/13/2020   PRIMARY CARE PROVIDER: Jovany Salmeron MD     ATTENDING PHYSICIAN: Margot Crowe DO   DISCHARGING PROVIDER: [address], DO    To contact this individual call [phone] and ask the  to page. If unavailable ask to be transferred the Adult Hospitalist Department. CONSULTATIONS: IP CONSULT TO HOSPITALIST  IP CONSULT TO PSYCHIATRY    PROCEDURES/SURGERIES: * No surgery found *    ADMITTING [facility] COURSE:     17-year-old female with past medical history fibromyalgia, bipolar disease, presenting to [facility] emergency department with altered mental status. On admission, patient encephalopathic and history taken from significant other at bedside. Patient reported to have been drinking alcohol and taking unknown amount of Flexeril. She then became lethargic and difficult to arouse. Taken to emergency department for further evaluation. In the ED, patient tachycardic, tachypneic, hypertensive. Narcan given without improvement. CT head negative. UDS negative. Transferred to [facility] for further care. Poison control contacted on admission. Patient improved with supportive care. Denies suicidal and homicidal ideation.   She was stable to discharge home.           DISCHARGE DIAGNOSES / PLAN:      Unintentional overdose:   Toxic encephalopathy: Resolved  -suspect secondary to flexeril  -negative UDS, salicylate, acetaminophen  -Improved with supportive care    Tachycardia, tachypnea: Resolved  -Secondary to above     Fibromyalgia:  -outpatient follow up   Bipolar disease:  -home Abilify when appropriate     ADDITIONAL CARE RECOMMENDATIONS:   Resume home medications    Follow up with primary care physician with 1 week of discharge from the hospital    PENDING TEST RESULTS:   At the time of discharge the following test results are still pending: none    FOLLOW UP APPOINTMENTS:    Follow-up Information     Follow up With Specialties Details Why Contact Info    Cesar Richmond MD Internal Medicine   [address] [place] [address] Street  [phone]             DISCHARGE MEDICATIONS:  Discharge Medication List as of 3/13/2020  3:24 PM      CONTINUE these medications which have NOT CHANGED    Details   cyclobenzaprine HCl (FLEXERIL PO) Take  by mouth., Historical Med      ondansetron (ZOFRAN ODT) 4 mg disintegrating tablet Take 1 Tab by mouth every eight (8) hours as needed for Nausea. , Print, Disp-10 Tab, R-0      esomeprazole (NEXIUM) 40 mg capsule Take 40 mg by mouth daily. , Historical Med      ARIPiprazole (ABILIFY) 5 mg tablet Take 5 mg by mouth daily. , Historical Med      clonazePAM (KLONOPIN) 0.5 mg tablet Take 4 mg by mouth daily. Indications: BART ASSOCIATED WITH BIPOLAR DISORDER, ADJUNCT, Historical Med               NOTIFY YOUR PHYSICIAN FOR ANY OF THE FOLLOWING:   Fever over 101 degrees for 24 hours. Chest pain, shortness of breath, fever, chills, nausea, vomiting, diarrhea, change in mentation, falling, weakness, bleeding. Severe pain or pain not relieved by medications. Or, any other signs or symptoms that you may have questions about. DISPOSITION:  x  Home With:   OT  PT  HH  RN       Long term SNF/Inpatient Rehab    Independent/assisted living    Hospice    Other:       PATIENT CONDITION AT DISCHARGE:     Functional status    Poor     Deconditioned    x Independent      Cognition   x  Lucid     Forgetful     Dementia      Catheters/lines (plus indication)    Antunez     PICC     PEG    x None      Code status   x  Full code     DNR      PHYSICAL EXAMINATION AT DISCHARGE:  General:          Alert, cooperative, no distress, appears stated age.      HEENT:           Atraumatic, anicteric sclerae, pink conjunctivae                          No oral ulcers, mucosa moist, throat clear, dentition fair  Neck:               Supple, symmetrical  Lungs:             Clear to auscultation bilaterally  Chest wall:      No tenderness  No Accessory muscle use. Heart:              Regular  rhythm,  No  murmur   No edema  Abdomen:        Soft, non-tender. Not distended. Bowel sounds normal  Extremities:     No cyanosis. No clubbing,                            Skin turgor normal, Capillary refill normal  Skin:                Not pale.   Not Jaundiced  No rashes   Psych:             + anxious   Neurologic:      Alert, moves all extremities, answers questions appropriately and responds to commands       CHRONIC MEDICAL DIAGNOSES:  Problem List as of 3/13/2020 Never Reviewed          Codes Class Noted - Resolved    OD (overdose of drug), accidental or unintentional, initial encounter ICD-10-CM: T50.901A  ICD-9-CM: 977.9, E858.9  3/13/2020 - Present        Drug overdose ICD-10-CM: T50.901A  ICD-9-CM: 977.9, E980.5  3/12/2020 - Present        Altered mental status ICD-10-CM: R41.82  ICD-9-CM: 780.97  3/12/2020 - Present              Greater than 30 minutes were spent with the patient on counseling and coordination of care    Signed:   Edwige Sanderson DO  3/13/2020  6:27 PM

## 2020-03-13 NOTE — PROGRESS NOTES
Problem: Falls - Risk of  Goal: *Absence of Falls  Description: Document Jefferson Fallon Fall Risk and appropriate interventions in the flowsheet. Outcome: Progressing Towards Goal  Note: Fall Risk Interventions:  Mobility Interventions: Communicate number of staff needed for ambulation/transfer, Patient to call before getting OOB    Mentation Interventions: Bed/chair exit alarm, Adequate sleep, hydration, pain control, Door open when patient unattended, Family/sitter at bedside, More frequent rounding, Increase mobility, Reorient patient, Room close to nurse's station, Toileting rounds, Update white board    Medication Interventions: Patient to call before getting OOB, Teach patient to arise slowly    Elimination Interventions: Call light in reach, Patient to call for help with toileting needs, Toileting schedule/hourly rounds       Problem: Altered Thought Process (Adult/Pediatric)  Goal: *LTG: Returns to baseline functioning  Outcome: Progressing Towards Goal   Pt alert and oriented to person and time. Pt disoriented to place and situation. Pts speech clear, but rambling and switching from one thought to the next. Bedside shift change report given to Teofilo Beltrán RN (oncoming nurse) by Maryam Arriaga RN (offgoing nurse). Report included the following information SBAR, Kardex, ED Summary, Procedure Summary, Intake/Output, MAR, Accordion, Recent Results, Med Rec Status, Cardiac Rhythm NSR and Alarm Parameters .

## 2020-03-13 NOTE — CDMP QUERY
Query 1 of 1 Patient admitted with probable unintentional overdose of Flexeril. Noted documentation of 'encephalopathy' in H&P. Please further specify type of Encephalopathy in the medical record ? Toxic Encephalopathy 
? Encephalopathy, unspecified ? Other, please specify ? Clinically unable to determine The medical record reflects the following: 
   Risk Factors: OD flexeril Clinical Indicators:  
 
ED Jennifer PN 3/12- \" The patient is usually alert, oriented x4, ambulates without difficulty and take care of ADLs.  noted that the patient was increasingly confused, lethargic and difficult to arouse and speaking incomprehensible. restless and combative and moving uncontrollably. Lethargic but arousable to painful stimuli; GCS of 8; sensory and motor are nonfocal\";  
 
H&P 3/12- \"Patient currently encephalopathic and history is obtained from significant other at bedside. Per significant other, patient was lethargic this morning and he brought the patient to the emergency department for further evaluation. Patient was drinking last night and partner assumes she incorrectly took her home Flexeril. During my encounter, patient able to state name and year. Not oriented to place. Lethargic and falls asleep during encounter\"; 
 
RN Note 3/12- \"Sitter at bedside. Pt agitated, aggressive, and not listening to safety commands. [facility] called\"; Jonah Vincent 3/12- \"[facility] called. Patient walking around room, unbalanced, high risk for falls. Remains disoriented\"; 
  
   Treatment: CT Head; check UDS; check Acetaminophen/salicylate levels; narcan, Haldol PRN Thank you, Colten Rios, RN, BSN, [facility] Clinical  
[facility] 
[phone]

## 2020-03-13 NOTE — PROGRESS NOTES
Informed by sitter, psych came to bedside and asked the patient if she wanted to speak with her, the patient stated \"no\"  Provider left. Dr. Waed Nino notified. Patient remains on suicide precautions, Dr. Wade Nino reports she will call psych. Problem: Altered Thought Process (Adult/Pediatric)  Goal: *STG: Remains safe in hospital  Outcome: Progressing Towards Goal  Note: Patient A/O x4, suicide precautions maintained, sitter at bedside. Problem: Pain  Goal: *Control of Pain  Outcome: Progressing Towards Goal  Note: Patient complained of back pain, refused tylenol, non pharmacological pain management implemented. Discharge medications reviewed with patient and appropriate educational materials and side effects teaching were provided. I have reviewed discharge instructions with the patient. The patient verbalized understanding. Patient taken to emergency room parking lot for discharge with RN and significant other.

## 2020-03-13 NOTE — DISCHARGE INSTRUCTIONS
Discharge Instructions       PATIENT ID: Kira eRne  MRN: [id number]   YOB: 1970    DATE OF ADMISSION: 3/12/2020  4:43 AM    DATE OF DISCHARGE: 3/13/2020    PRIMARY CARE PROVIDER: More Taylor MD     ATTENDING PHYSICIAN: Josh Cano DO  DISCHARGING PROVIDER: Aaron Santos DO    To contact this individual call [phone] and ask the  to page. If unavailable ask to be transferred the Adult Hospitalist Department. DISCHARGE DIAGNOSES     Overdose     CONSULTATIONS: IP CONSULT TO HOSPITALIST  IP CONSULT TO PSYCHIATRY    PROCEDURES/SURGERIES: * No surgery found *    PENDING TEST RESULTS:   At the time of discharge the following test results are still pending: none    FOLLOW UP APPOINTMENTS:   Follow-up Information     Follow up With Specialties Details Why Contact Info    More Taylor MD Internal Medicine   [facility] [phone]             ADDITIONAL CARE RECOMMENDATIONS:     Resume home medications    Follow up with primary care physician with 1 week of discharge from the hospital      DISCHARGE MEDICATIONS:   See Medication Reconciliation Form    · It is important that you take the medication exactly as they are prescribed. · Keep your medication in the bottles provided by the pharmacist and keep a list of the medication names, dosages, and times to be taken in your wallet. · Do not take other medications without consulting your doctor. NOTIFY YOUR PHYSICIAN FOR ANY OF THE FOLLOWING:   Fever over 101 degrees for 24 hours. Chest pain, shortness of breath, fever, chills, nausea, vomiting, diarrhea, change in mentation, falling, weakness, bleeding. Severe pain or pain not relieved by medications. Or, any other signs or symptoms that you may have questions about.       Signed:   Aaron Santos DO  3/13/2020  3:06 PM

## 2020-03-13 NOTE — PROGRESS NOTES
2030: Received call from Gabriel Mares at [place] [address] requested update on patient status. Gabriel Mares advised that to order benzo's versus haldol at this time as well as recommended repeat Acetaminophen and Salicylate labs as initial labs were drawn too soon. 2035: Informed Se Roy NP of Poison control recommendations. Orders received for repeat labwork. 0000: Bedside and Verbal shift change report given to Jan Koch RN (oncoming nurse) by Charito Laird RN (offgoing nurse). Report included the following information SBAR, Kardex, Intake/Output, MAR, Accordion, Recent Results, Med Rec Status and Cardiac Rhythm NSR.     Hourly rounds completed throughout shift

## 2020-03-13 NOTE — CONSULTS
Attempted to speak to patient, but right after I introduced myself, she said, \"I don't need psychiatrist for taking 3 tabs of flexeril. I don't want speak to psychiatrist.\"    Psych consult will be cancelled.

## 2020-06-23 ENCOUNTER — HOSPITAL ENCOUNTER (EMERGENCY)
Age: 50
Discharge: HOME | End: 2020-06-23
Attending: EMERGENCY MEDICINE
Payer: MEDICARE

## 2020-06-23 VITALS
HEART RATE: 81 BPM | BODY MASS INDEX: 29.76 KG/M2 | TEMPERATURE: 98.4 F | WEIGHT: 185.19 LBS | OXYGEN SATURATION: 97 % | DIASTOLIC BLOOD PRESSURE: 81 MMHG | HEIGHT: 66 IN | RESPIRATION RATE: 16 BRPM | SYSTOLIC BLOOD PRESSURE: 144 MMHG

## 2020-06-23 DIAGNOSIS — J01.90 ACUTE SINUSITIS, RECURRENCE NOT SPECIFIED, UNSPECIFIED LOCATION: Primary | ICD-10-CM

## 2020-06-23 PROCEDURE — 99283 EMERGENCY DEPT VISIT LOW MDM: CPT

## 2020-06-23 PROCEDURE — 74011250637 HC RX REV CODE- 250/637: Performed by: EMERGENCY MEDICINE

## 2020-06-23 RX ORDER — TRAMADOL HYDROCHLORIDE 50 MG/1
50 TABLET ORAL
Qty: 10 TAB | Refills: 0 | Status: SHIPPED | OUTPATIENT
Start: 2020-06-23 | End: 2020-06-26

## 2020-06-23 RX ORDER — AMOXICILLIN AND CLAVULANATE POTASSIUM 875; 125 MG/1; MG/1
1 TABLET, FILM COATED ORAL
Status: COMPLETED | OUTPATIENT
Start: 2020-06-23 | End: 2020-06-23

## 2020-06-23 RX ORDER — AMOXICILLIN AND CLAVULANATE POTASSIUM 875; 125 MG/1; MG/1
1 TABLET, FILM COATED ORAL 2 TIMES DAILY
Qty: 20 TAB | Refills: 0 | Status: SHIPPED | OUTPATIENT
Start: 2020-06-23 | End: 2020-07-03

## 2020-06-23 RX ORDER — TRAMADOL HYDROCHLORIDE 50 MG/1
50 TABLET ORAL
Status: COMPLETED | OUTPATIENT
Start: 2020-06-23 | End: 2020-06-23

## 2020-06-23 RX ADMIN — TRAMADOL HYDROCHLORIDE 50 MG: 50 TABLET, FILM COATED ORAL at 22:06

## 2020-06-23 RX ADMIN — AMOXICILLIN AND CLAVULANATE POTASSIUM 1 TABLET: 875; 125 TABLET, FILM COATED ORAL at 22:06

## 2020-06-24 NOTE — ED PROVIDER NOTES
Sinus Pain    This is a new problem. The current episode started more than 1 week ago. There has been no fever. The pain is moderate. The pain has been constant since onset. Associated symptoms include congestion, ear pain, sinus pressure and headaches. Pertinent negatives include no chills, no sweats, no hoarse voice, no sore throat, no cough, no shortness of breath, no neck pain, no neck pain and no chest pain. She has tried acetaminophen and decongestants for the symptoms. The treatment provided mild relief. Past Medical History:   Diagnosis Date    Asthma     Bronchitis     Fibromyalgia     Seasonal allergic reaction        Past Surgical History:   Procedure Laterality Date    HX APPENDECTOMY      HX GASTRIC BYPASS      HX GYN      HX ORTHOPAEDIC      lumbar fusion    HX TUBAL LIGATION           History reviewed. No pertinent family history.     Social History     Socioeconomic History    Marital status:      Spouse name: Not on file    Number of children: Not on file    Years of education: Not on file    Highest education level: Not on file   Occupational History    Not on file   Social Needs    Financial resource strain: Not on file    Food insecurity     Worry: Not on file     Inability: Not on file    Transportation needs     Medical: Not on file     Non-medical: Not on file   Tobacco Use    Smoking status: Former Smoker    Smokeless tobacco: Never Used   Substance and Sexual Activity    Alcohol use: Yes     Comment: rarely    Drug use: Yes     Types: Prescription    Sexual activity: Not on file   Lifestyle    Physical activity     Days per week: Not on file     Minutes per session: Not on file    Stress: Not on file   Relationships    Social connections     Talks on phone: Not on file     Gets together: Not on file     Attends [employer]ist service: Not on file     Active member of club or organization: Not on file     Attends meetings of clubs or organizations: Not on file     Relationship status: Not on file    Intimate partner violence     Fear of current or ex partner: Not on file     Emotionally abused: Not on file     Physically abused: Not on file     Forced sexual activity: Not on file   Other Topics Concern    Not on file   Social History Narrative    Not on file         ALLERGIES: Lortab [hydrocodone-acetaminophen]; Nsaids (non-steroidal anti-inflammatory drug); and Percocet [oxycodone-acetaminophen]    Review of Systems   Constitutional: Negative for chills and fever. HENT: Positive for congestion, ear pain, sinus pressure and sinus pain. Negative for hoarse voice and sore throat. Eyes: Negative for pain. Respiratory: Negative for cough and shortness of breath. Cardiovascular: Negative for chest pain. Gastrointestinal: Negative for abdominal pain, diarrhea, nausea and vomiting. Genitourinary: Negative for dysuria and flank pain. Musculoskeletal: Negative for back pain and neck pain. Skin: Negative for rash. Neurological: Positive for headaches. Negative for dizziness. Vitals:    06/23/20 2152   BP: 155/85   Pulse: 81   Resp: 16   Temp: 98.4 °F (36.9 °C)   SpO2: 100%   Weight: 84 kg (185 lb 3 oz)   Height: 5' 6\" (1.676 m)            Physical Exam  Constitutional:       Appearance: She is well-developed. HENT:      Head: Normocephalic. Nose:      Right Sinus: Maxillary sinus tenderness and frontal sinus tenderness present. Left Sinus: Maxillary sinus tenderness and frontal sinus tenderness present. Comments: B/l serous TM effusions. No bulging or drainage  Nasal congestion  Posterior oropharynx erythema, cobblestoning appearance. No edema, no enlarge tonsils or exduate. Eyes:      Conjunctiva/sclera: Conjunctivae normal.   Neck:      Musculoskeletal: Normal range of motion and neck supple. Cardiovascular:      Rate and Rhythm: Normal rate and regular rhythm.    Pulmonary:      Effort: Pulmonary effort is normal. No respiratory distress. Breath sounds: Normal breath sounds. Musculoskeletal: Normal range of motion. Skin:     General: Skin is warm. Capillary Refill: Capillary refill takes less than 2 seconds. Findings: No rash. Neurological:      Mental Status: She is alert and oriented to person, place, and time. Comments: No gross motor or sensory deficits          MDM  Number of Diagnoses or Management Options  Acute sinusitis, recurrence not specified, unspecified location:   Diagnosis management comments: Patient presenting to the ER with sinus pain has been worsening over the course of the last month. Nasal congestion and ear pain. And with frontal headache. No fevers or chills. Having rhinorrhea. Patient has been taking seasonal allergy medication with no improvement. Based on patient's symptoms concern for sinusitis. Based on duration of symptoms concerning for bacterial sinusitis will treat with Augmentin. Advised patient to continue taking her seasonal allergies advised to start Flonase.            Procedures

## 2020-06-24 NOTE — ED TRIAGE NOTES
C/o of facial, ear, and nasal pain with nasal drainage for over a month. Has seen pcp and rx sinus and allergy medications.

## 2020-06-24 NOTE — ED NOTES
I have reviewed discharge instructions with the patient. The patient verbalized understanding. Ambulatory to spouses vehicle. nad.

## 2020-07-05 ENCOUNTER — APPOINTMENT (OUTPATIENT)
Dept: GENERAL RADIOLOGY | Age: 50
End: 2020-07-05
Attending: EMERGENCY MEDICINE
Payer: MEDICARE

## 2020-07-05 ENCOUNTER — HOSPITAL ENCOUNTER (EMERGENCY)
Age: 50
Discharge: HOME | End: 2020-07-05
Attending: EMERGENCY MEDICINE
Payer: MEDICARE

## 2020-07-05 VITALS
WEIGHT: 175.49 LBS | BODY MASS INDEX: 28.2 KG/M2 | HEART RATE: 81 BPM | SYSTOLIC BLOOD PRESSURE: 118 MMHG | RESPIRATION RATE: 21 BRPM | OXYGEN SATURATION: 98 % | TEMPERATURE: 98.2 F | HEIGHT: 66 IN | DIASTOLIC BLOOD PRESSURE: 79 MMHG

## 2020-07-05 DIAGNOSIS — R07.89 LEFT-SIDED CHEST WALL PAIN: Primary | ICD-10-CM

## 2020-07-05 LAB
ALBUMIN SERPL-MCNC: 3.8 G/DL (ref 3.5–5)
ALBUMIN/GLOB SERPL: 1 {RATIO} (ref 1.1–2.2)
ALP SERPL-CCNC: 98 U/L (ref 45–117)
ALT SERPL-CCNC: 19 U/L (ref 12–78)
ANION GAP SERPL CALC-SCNC: 7 MMOL/L (ref 5–15)
AST SERPL-CCNC: 20 U/L (ref 15–37)
BASOPHILS # BLD: 0.1 K/UL (ref 0–0.1)
BASOPHILS NFR BLD: 1 % (ref 0–1)
BILIRUB SERPL-MCNC: 0.3 MG/DL (ref 0.2–1)
BUN SERPL-MCNC: 7 MG/DL (ref 6–20)
BUN/CREAT SERPL: 11 (ref 12–20)
CALCIUM SERPL-MCNC: 8.9 MG/DL (ref 8.5–10.1)
CHLORIDE SERPL-SCNC: 106 MMOL/L (ref 97–108)
CO2 SERPL-SCNC: 31 MMOL/L (ref 21–32)
CREAT SERPL-MCNC: 0.64 MG/DL (ref 0.55–1.02)
D DIMER PPP FEU-MCNC: 0.56 MG/L FEU (ref 0–0.65)
DIFFERENTIAL METHOD BLD: ABNORMAL
EOSINOPHIL # BLD: 0.2 K/UL (ref 0–0.4)
EOSINOPHIL NFR BLD: 3 % (ref 0–7)
ERYTHROCYTE [DISTWIDTH] IN BLOOD BY AUTOMATED COUNT: 12 % (ref 11.5–14.5)
GLOBULIN SER CALC-MCNC: 3.7 G/DL (ref 2–4)
GLUCOSE SERPL-MCNC: 86 MG/DL (ref 65–100)
HCT VFR BLD AUTO: 39.9 % (ref 35–47)
HGB BLD-MCNC: 12.4 G/DL (ref 11.5–16)
IMM GRANULOCYTES # BLD AUTO: 0 K/UL (ref 0–0.04)
IMM GRANULOCYTES NFR BLD AUTO: 0 % (ref 0–0.5)
LYMPHOCYTES # BLD: 1.7 K/UL (ref 0.8–3.5)
LYMPHOCYTES NFR BLD: 27 % (ref 12–49)
MCH RBC QN AUTO: 27.1 PG (ref 26–34)
MCHC RBC AUTO-ENTMCNC: 31.1 G/DL (ref 30–36.5)
MCV RBC AUTO: 87.1 FL (ref 80–99)
MONOCYTES # BLD: 0.5 K/UL (ref 0–1)
MONOCYTES NFR BLD: 7 % (ref 5–13)
NEUTS SEG # BLD: 4.1 K/UL (ref 1.8–8)
NEUTS SEG NFR BLD: 62 % (ref 32–75)
NRBC # BLD: 0 K/UL (ref 0–0.01)
NRBC BLD-RTO: 0 PER 100 WBC
PLATELET # BLD AUTO: 251 K/UL (ref 150–400)
PMV BLD AUTO: 8.8 FL (ref 8.9–12.9)
POTASSIUM SERPL-SCNC: 4.5 MMOL/L (ref 3.5–5.1)
PROT SERPL-MCNC: 7.5 G/DL (ref 6.4–8.2)
RBC # BLD AUTO: 4.58 M/UL (ref 3.8–5.2)
SODIUM SERPL-SCNC: 144 MMOL/L (ref 136–145)
TROPONIN I SERPL-MCNC: <0.05 NG/ML
WBC # BLD AUTO: 6.5 K/UL (ref 3.6–11)

## 2020-07-05 PROCEDURE — 74011250637 HC RX REV CODE- 250/637: Performed by: EMERGENCY MEDICINE

## 2020-07-05 PROCEDURE — 80053 COMPREHEN METABOLIC PANEL: CPT

## 2020-07-05 PROCEDURE — 85025 COMPLETE CBC W/AUTO DIFF WBC: CPT

## 2020-07-05 PROCEDURE — 93005 ELECTROCARDIOGRAM TRACING: CPT

## 2020-07-05 PROCEDURE — 84484 ASSAY OF TROPONIN QUANT: CPT

## 2020-07-05 PROCEDURE — 71046 X-RAY EXAM CHEST 2 VIEWS: CPT

## 2020-07-05 PROCEDURE — 96374 THER/PROPH/DIAG INJ IV PUSH: CPT

## 2020-07-05 PROCEDURE — 74011250636 HC RX REV CODE- 250/636: Performed by: EMERGENCY MEDICINE

## 2020-07-05 PROCEDURE — 99284 EMERGENCY DEPT VISIT MOD MDM: CPT

## 2020-07-05 PROCEDURE — 85379 FIBRIN DEGRADATION QUANT: CPT

## 2020-07-05 RX ORDER — ACETAMINOPHEN 500 MG
1000 TABLET ORAL
Status: COMPLETED | OUTPATIENT
Start: 2020-07-05 | End: 2020-07-05

## 2020-07-05 RX ORDER — KETOROLAC TROMETHAMINE 30 MG/ML
15 INJECTION, SOLUTION INTRAMUSCULAR; INTRAVENOUS
Status: COMPLETED | OUTPATIENT
Start: 2020-07-05 | End: 2020-07-05

## 2020-07-05 RX ORDER — ACETAMINOPHEN 500 MG
1000 TABLET ORAL
Qty: 20 TAB | Refills: 0 | Status: SHIPPED | OUTPATIENT
Start: 2020-07-05 | End: 2021-09-13

## 2020-07-05 RX ORDER — DICLOFENAC SODIUM 10 MG/G
4 GEL TOPICAL
Qty: 100 G | Refills: 0 | Status: SHIPPED | OUTPATIENT
Start: 2020-07-05 | End: 2021-09-13

## 2020-07-05 RX ADMIN — KETOROLAC TROMETHAMINE 15 MG: 30 INJECTION, SOLUTION INTRAMUSCULAR at 09:26

## 2020-07-05 RX ADMIN — ACETAMINOPHEN 1000 MG: 500 TABLET ORAL at 09:26

## 2020-07-05 NOTE — ED PROVIDER NOTES
The history is provided by the patient. Chest Pain (Angina)    This is a new problem. The current episode started 6 to 12 hours ago. The problem has not changed since onset. Duration of episode(s) is 7 hours. The problem occurs constantly. The pain is associated with breathing and movement. The pain is present in the lateral region and left side. The pain is moderate. The quality of the pain is described as sharp. The pain radiates to the upper back and mid back. The symptoms are aggravated by deep breathing, palpation and certain positions. Pertinent negatives include no abdominal pain, no cough, no fever, no lower extremity edema, no numbness, no palpitations, no shortness of breath and no vomiting. She has tried nothing for the symptoms. Risk factors include no risk factors. Past Medical History:   Diagnosis Date    Asthma     Bronchitis     Fibromyalgia     Seasonal allergic reaction        Past Surgical History:   Procedure Laterality Date    HX APPENDECTOMY      HX GASTRIC BYPASS      HX GYN      HX ORTHOPAEDIC      lumbar fusion    HX TUBAL LIGATION           History reviewed. No pertinent family history.     Social History     Socioeconomic History    Marital status:      Spouse name: Not on file    Number of children: Not on file    Years of education: Not on file    Highest education level: Not on file   Occupational History    Not on file   Social Needs    Financial resource strain: Not on file    Food insecurity     Worry: Not on file     Inability: Not on file    Transportation needs     Medical: Not on file     Non-medical: Not on file   Tobacco Use    Smoking status: Former Smoker    Smokeless tobacco: Never Used   Substance and Sexual Activity    Alcohol use: Yes     Comment: rarely    Drug use: Yes     Types: Prescription    Sexual activity: Not on file   Lifestyle    Physical activity     Days per week: Not on file     Minutes per session: Not on file    Stress: Not on file   Relationships    Social connections     Talks on phone: Not on file     Gets together: Not on file     Attends Tenriism service: Not on file     Active member of club or organization: Not on file     Attends meetings of clubs or organizations: Not on file     Relationship status: Not on file    Intimate partner violence     Fear of current or ex partner: Not on file     Emotionally abused: Not on file     Physically abused: Not on file     Forced sexual activity: Not on file   Other Topics Concern    Not on file   Social History Narrative    Not on file         ALLERGIES: Lortab [hydrocodone-acetaminophen]; Nsaids (non-steroidal anti-inflammatory drug); and Percocet [oxycodone-acetaminophen]    Review of Systems   Constitutional: Negative for fever. Respiratory: Negative for cough and shortness of breath. Cardiovascular: Positive for chest pain. Negative for palpitations. Gastrointestinal: Negative for abdominal pain and vomiting. Neurological: Negative for numbness. All other systems reviewed and are negative. Vitals:    07/05/20 0908   BP: 137/78   Pulse: 77   Resp: 16   Temp: 98.2 °F (36.8 °C)   SpO2: 98%   Weight: 79.6 kg (175 lb 7.8 oz)   Height: 5' 6\" (1.676 m)            Physical Exam  Vitals signs and nursing note reviewed. Constitutional:       General: She is not in acute distress. Appearance: She is well-developed. HENT:      Head: Normocephalic and atraumatic. Eyes:      Conjunctiva/sclera: Conjunctivae normal.   Neck:      Musculoskeletal: Neck supple. Cardiovascular:      Rate and Rhythm: Normal rate and regular rhythm. Pulmonary:      Effort: Pulmonary effort is normal. No respiratory distress. Chest:      Chest wall: Tenderness present. Abdominal:      General: There is no distension. Musculoskeletal: Normal range of motion. General: No deformity. Thoracic back: She exhibits tenderness.  She exhibits normal range of motion, no bony tenderness and no spasm. Back:    Skin:     General: Skin is warm and dry. Neurological:      Mental Status: She is alert. Cranial Nerves: No cranial nerve deficit. Psychiatric:         Behavior: Behavior normal.          MDM     44 y.o. female presents with chest pain starting 7 hours ago. Pain is reproducible with palpation and movement, highly atypical in nature. Low risk for ACS with normal EKG and negative troponin. Low risk without signs and symptoms of PE with negative d-dimer. Bedside echo without pericardial effusion or signs of dissection. Appears musculoskeletal in nature. Patient was recommended to take short course of scheduled tylenol with toical NSAIDs (due to oral intolerance with gastric bypass and history of ulcers) and engage in early mobility as definitive treatment. Plan to follow up with PCP as needed and return precautions discussed for worsening or new concerning symptoms. Procedures    EKG 0908: Rate 79, Normal sinus rhythm, No ST segment or T wave abnormalities. Normal EKG.

## 2020-07-05 NOTE — ED TRIAGE NOTES
Pt states around 3am midsternal chest pain started with SOB that radiates through to her back.  Denies n/v/dizziness

## 2020-07-05 NOTE — ED NOTES
The patient was discharged home by Dr eKn Oates in stable condition. The patient is alert and oriented, in no respiratory distress and discharge vital signs obtained. The patient's diagnosis, condition and treatment were explained. The patient expressed understanding. 2 prescriptions given. A discharge plan has been developed. Aftercare instructions were given. Pt ambulatory out of the ED with .

## 2020-07-06 LAB
ATRIAL RATE: 79 BPM
CALCULATED P AXIS, ECG09: 75 DEGREES
CALCULATED R AXIS, ECG10: 43 DEGREES
CALCULATED T AXIS, ECG11: 24 DEGREES
DIAGNOSIS, 93000: NORMAL
P-R INTERVAL, ECG05: 126 MS
Q-T INTERVAL, ECG07: 396 MS
QRS DURATION, ECG06: 76 MS
QTC CALCULATION (BEZET), ECG08: 454 MS
VENTRICULAR RATE, ECG03: 79 BPM

## 2020-07-15 ENCOUNTER — HOSPITAL ENCOUNTER (EMERGENCY)
Age: 50
Discharge: HOME | End: 2020-07-15
Attending: EMERGENCY MEDICINE
Payer: MEDICARE

## 2020-07-15 VITALS
HEIGHT: 66 IN | BODY MASS INDEX: 28.45 KG/M2 | SYSTOLIC BLOOD PRESSURE: 130 MMHG | DIASTOLIC BLOOD PRESSURE: 78 MMHG | TEMPERATURE: 98.1 F | WEIGHT: 177.03 LBS | RESPIRATION RATE: 14 BRPM | OXYGEN SATURATION: 98 % | HEART RATE: 75 BPM

## 2020-07-15 DIAGNOSIS — R11.2 NON-INTRACTABLE VOMITING WITH NAUSEA, UNSPECIFIED VOMITING TYPE: ICD-10-CM

## 2020-07-15 DIAGNOSIS — R51.9 NONINTRACTABLE EPISODIC HEADACHE, UNSPECIFIED HEADACHE TYPE: Primary | ICD-10-CM

## 2020-07-15 PROCEDURE — 74011250636 HC RX REV CODE- 250/636: Performed by: EMERGENCY MEDICINE

## 2020-07-15 PROCEDURE — 96374 THER/PROPH/DIAG INJ IV PUSH: CPT

## 2020-07-15 PROCEDURE — 99284 EMERGENCY DEPT VISIT MOD MDM: CPT

## 2020-07-15 PROCEDURE — 96375 TX/PRO/DX INJ NEW DRUG ADDON: CPT

## 2020-07-15 RX ORDER — METOCLOPRAMIDE HYDROCHLORIDE 5 MG/ML
10 INJECTION INTRAMUSCULAR; INTRAVENOUS
Status: COMPLETED | OUTPATIENT
Start: 2020-07-15 | End: 2020-07-15

## 2020-07-15 RX ORDER — DIPHENHYDRAMINE HYDROCHLORIDE 50 MG/ML
50 INJECTION, SOLUTION INTRAMUSCULAR; INTRAVENOUS
Status: COMPLETED | OUTPATIENT
Start: 2020-07-15 | End: 2020-07-15

## 2020-07-15 RX ORDER — DEXAMETHASONE SODIUM PHOSPHATE 10 MG/ML
10 INJECTION INTRAMUSCULAR; INTRAVENOUS ONCE
Status: COMPLETED | OUTPATIENT
Start: 2020-07-15 | End: 2020-07-15

## 2020-07-15 RX ADMIN — SODIUM CHLORIDE 1000 ML: 900 INJECTION, SOLUTION INTRAVENOUS at 13:37

## 2020-07-15 RX ADMIN — DEXAMETHASONE SODIUM PHOSPHATE 10 MG: 10 INJECTION, SOLUTION INTRAMUSCULAR; INTRAVENOUS at 13:36

## 2020-07-15 RX ADMIN — METOCLOPRAMIDE 10 MG: 5 INJECTION, SOLUTION INTRAMUSCULAR; INTRAVENOUS at 13:36

## 2020-07-15 RX ADMIN — DIPHENHYDRAMINE HYDROCHLORIDE 50 MG: 50 INJECTION, SOLUTION INTRAMUSCULAR; INTRAVENOUS at 13:36

## 2020-07-15 NOTE — ED NOTES
Discharge instructions reviewed with pt and copy given by this RN. Pt advised to follow up with PCP. Pt educated on signs and symptoms to monitor for that would require return to ED. Pt ambulatory from ED refusing wheelchair ride accompanied by boyfriend. Pt in no sign of distress or discomfort with discharge.

## 2020-07-15 NOTE — ED TRIAGE NOTES
Pt reports migraine headache 10/10 since last night. Pt reports having tylenol this am and can not take PO NSAIDs r/t gastric bypass surgery. Pt reports vomiting since am r/t pain.

## 2020-07-15 NOTE — ED NOTES
ER MD disconnected patient from monitor and patient ambulated to ED restroom at this time. Patient reports pain is decreased to 5/10 at this time.

## 2020-07-15 NOTE — ED PROVIDER NOTES
Date of Service:  7/15/2020    Patient:  Nam Smith    Chief Complaint:  Migraine       HPI:  Nam Smith is a 44 y.o.  female who presents for evaluation of headache. Patient started with a typical type migraine last evening and went to bed. This morning she woke up vomiting with complaints of photophobia and phonophobia consistent with her previous migraines. Headache is across the forehead. No trauma. No numbness or tingling. No loss of bowel or bladder. No headaches chills chest pain shortness of breath abdominal discomfort or other acute complaints. Is not the worst headache of her life nor was it a thunderclap type headache           Past Medical History:   Diagnosis Date    Asthma     Bronchitis     Fibromyalgia     Migraines     Seasonal allergic reaction        Past Surgical History:   Procedure Laterality Date    HX APPENDECTOMY      HX GASTRIC BYPASS      HX GYN      HX ORTHOPAEDIC      lumbar fusion    HX TUBAL LIGATION           History reviewed. No pertinent family history.     Social History     Socioeconomic History    Marital status:      Spouse name: Not on file    Number of children: Not on file    Years of education: Not on file    Highest education level: Not on file   Occupational History    Not on file   Social Needs    Financial resource strain: Not on file    Food insecurity     Worry: Not on file     Inability: Not on file    Transportation needs     Medical: Not on file     Non-medical: Not on file   Tobacco Use    Smoking status: Former Smoker    Smokeless tobacco: Never Used   Substance and Sexual Activity    Alcohol use: Yes     Comment: rarely    Drug use: Yes     Types: Prescription    Sexual activity: Not on file   Lifestyle    Physical activity     Days per week: Not on file     Minutes per session: Not on file    Stress: Not on file   Relationships    Social connections     Talks on phone: Not on file     Gets together: Not on file     Attends [employer] service: Not on file     Active member of club or organization: Not on file     Attends meetings of clubs or organizations: Not on file     Relationship status: Not on file    Intimate partner violence     Fear of current or ex partner: Not on file     Emotionally abused: Not on file     Physically abused: Not on file     Forced sexual activity: Not on file   Other Topics Concern    Not on file   Social History Narrative    Not on file         ALLERGIES: Lortab [hydrocodone-acetaminophen]; Nsaids (non-steroidal anti-inflammatory drug); and Percocet [oxycodone-acetaminophen]    Review of Systems   Constitutional: Negative for fever. HENT: Negative for hearing loss. Eyes: Positive for photophobia. Negative for visual disturbance. Respiratory: Negative for shortness of breath. Cardiovascular: Negative for chest pain. Gastrointestinal: Positive for nausea and vomiting. Negative for abdominal pain. Genitourinary: Negative for flank pain. Musculoskeletal: Negative for back pain. Skin: Negative for rash. Neurological: Positive for headaches. Negative for dizziness, weakness and light-headedness. Psychiatric/Behavioral: Negative for confusion. Vitals:    07/15/20 1312   BP: 139/83   Pulse: 75   Resp: 14   Temp: 98.1 °F (36.7 °C)   SpO2: 98%   Weight: 80.3 kg (177 lb 0.5 oz)   Height: 5' 6\" (1.676 m)            Physical Exam  Vitals signs and nursing note reviewed. Constitutional:       General: She is not in acute distress. Appearance: She is well-developed. HENT:      Head: Normocephalic and atraumatic. Right Ear: Tympanic membrane normal.      Left Ear: Tympanic membrane normal.      Nose: Nose normal. No congestion. Mouth/Throat:      Mouth: Mucous membranes are moist.      Pharynx: No oropharyngeal exudate or posterior oropharyngeal erythema. Eyes:      General: No scleral icterus. Extraocular Movements: Extraocular movements intact. Conjunctiva/sclera: Conjunctivae normal.      Pupils: Pupils are equal, round, and reactive to light. Neck:      Musculoskeletal: Neck supple. Vascular: No JVD. Trachea: No tracheal deviation. Cardiovascular:      Rate and Rhythm: Normal rate and regular rhythm. Pulmonary:      Effort: Pulmonary effort is normal. No respiratory distress. Abdominal:      General: Bowel sounds are normal.      Palpations: Abdomen is soft. Musculoskeletal: Normal range of motion. Skin:     General: Skin is warm and dry. Capillary Refill: Capillary refill takes less than 2 seconds. Findings: No rash. Neurological:      Mental Status: She is alert and oriented to person, place, and time. Mental status is at baseline. Cranial Nerves: No cranial nerve deficit. Sensory: No sensory deficit. Motor: No weakness. Coordination: Coordination normal.      Gait: Gait normal.   Psychiatric:         Mood and Affect: Mood normal.         Behavior: Behavior normal.          MDM  Number of Diagnoses or Management Options  Nonintractable episodic headache, unspecified headache type:   Non-intractable vomiting with nausea, unspecified vomiting type:         VITAL SIGNS:  Patient Vitals for the past 4 hrs:   Temp Pulse Resp BP SpO2   07/15/20 1427     98 %   07/15/20 1415    130/78 100 %   07/15/20 1400    124/83 100 %   07/15/20 1345    129/72 100 %   07/15/20 1342     100 %   07/15/20 1330    126/73 97 %   07/15/20 1312 98.1 °F (36.7 °C) 75 14 139/83 98 %   07/15/20 1307    139/83 99 %         LABS:  No results found for this or any previous visit (from the past 6 hour(s)).      IMAGING:  No orders to display         Medications During Visit:  Medications   sodium chloride 0.9 % bolus infusion 1,000 mL (0 mL IntraVENous IV Completed 7/15/20 1405)   metoclopramide HCl (REGLAN) injection 10 mg (10 mg IntraVENous Given 7/15/20 1336)   diphenhydrAMINE (BENADRYL) injection 50 mg (50 mg IntraVENous Given 7/15/20 1336)   dexamethasone (PF) (DECADRON) 10 mg/mL injection 10 mg (10 mg IntraVENous Given 7/15/20 1336)         DECISION MAKING:  Marge Vanegas is a 44 y.o. female who comes in as above. Neuro exam unremarkable. 75% resolution of symptoms. Will DC home with out pateint follow up. Most likely migraine causing symptoms. IMPRESSION:  1. Nonintractable episodic headache, unspecified headache type    2. Non-intractable vomiting with nausea, unspecified vomiting type        DISPOSITION:  Discharged      Discharge Medication List as of 7/15/2020  2:35 PM           Follow-up Information     Follow up With Specialties Details Why Contact Info    Shivani Donahue MD Internal Medicine Schedule an appointment as soon as possible for a visit   [id number] [place] [address],[place] [address]  [phone]              The patient is asked to follow-up with their primary care provider in the next several days. They are to call tomorrow for an appointment. The patient is asked to return promptly for any increased concerns or worsening of symptoms. They can return to this emergency department or any other emergency department.       Procedures

## 2020-09-05 NOTE — ED TRIAGE NOTES
Pt presents to ED with c/o sore throat/mouth. Pt had marilee-tonsilar abcess drained last 9/16/2018.  Pt states she is having pain in mouth and chest.  

If you are a smoker, it is important for your health to stop smoking. Please be aware that second hand smoke is also harmful.

## 2020-11-08 ENCOUNTER — APPOINTMENT (OUTPATIENT)
Dept: ULTRASOUND IMAGING | Age: 50
End: 2020-11-08
Attending: EMERGENCY MEDICINE
Payer: MEDICARE

## 2020-11-08 ENCOUNTER — HOSPITAL ENCOUNTER (EMERGENCY)
Age: 50
Discharge: HOME | End: 2020-11-08
Attending: EMERGENCY MEDICINE
Payer: MEDICARE

## 2020-11-08 VITALS
RESPIRATION RATE: 16 BRPM | BODY MASS INDEX: 28.47 KG/M2 | TEMPERATURE: 98.7 F | HEART RATE: 82 BPM | WEIGHT: 176.37 LBS | DIASTOLIC BLOOD PRESSURE: 90 MMHG | SYSTOLIC BLOOD PRESSURE: 131 MMHG | OXYGEN SATURATION: 99 %

## 2020-11-08 DIAGNOSIS — N95.0 POSTMENOPAUSAL VAGINAL BLEEDING: Primary | ICD-10-CM

## 2020-11-08 DIAGNOSIS — N39.0 URINARY TRACT INFECTION WITHOUT HEMATURIA, SITE UNSPECIFIED: ICD-10-CM

## 2020-11-08 LAB
ALBUMIN SERPL-MCNC: 3.7 G/DL (ref 3.5–5)
ALBUMIN/GLOB SERPL: 0.9 {RATIO} (ref 1.1–2.2)
ALP SERPL-CCNC: 112 U/L (ref 45–117)
ALT SERPL-CCNC: 21 U/L (ref 12–78)
ANION GAP SERPL CALC-SCNC: 12 MMOL/L (ref 5–15)
APPEARANCE UR: ABNORMAL
AST SERPL-CCNC: 19 U/L (ref 15–37)
BACTERIA URNS QL MICRO: ABNORMAL /HPF
BASOPHILS # BLD: 0 K/UL (ref 0–0.1)
BASOPHILS NFR BLD: 1 % (ref 0–1)
BILIRUB SERPL-MCNC: 0.5 MG/DL (ref 0.2–1)
BILIRUB UR QL: NEGATIVE
BUN SERPL-MCNC: 8 MG/DL (ref 6–20)
BUN/CREAT SERPL: 11 (ref 12–20)
CALCIUM SERPL-MCNC: 8.9 MG/DL (ref 8.5–10.1)
CHLORIDE SERPL-SCNC: 103 MMOL/L (ref 97–108)
CLUE CELLS VAG QL WET PREP: NORMAL
CO2 SERPL-SCNC: 26 MMOL/L (ref 21–32)
COLOR UR: ABNORMAL
COMMENT, HOLDF: NORMAL
CREAT SERPL-MCNC: 0.75 MG/DL (ref 0.55–1.02)
DIFFERENTIAL METHOD BLD: ABNORMAL
EOSINOPHIL # BLD: 0.1 K/UL (ref 0–0.4)
EOSINOPHIL NFR BLD: 2 % (ref 0–7)
EPITH CASTS URNS QL MICRO: ABNORMAL /LPF
ERYTHROCYTE [DISTWIDTH] IN BLOOD BY AUTOMATED COUNT: 12.2 % (ref 11.5–14.5)
GLOBULIN SER CALC-MCNC: 4 G/DL (ref 2–4)
GLUCOSE SERPL-MCNC: 128 MG/DL (ref 65–100)
GLUCOSE UR STRIP.AUTO-MCNC: NEGATIVE MG/DL
HCT VFR BLD AUTO: 38.1 % (ref 35–47)
HGB BLD-MCNC: 11.8 G/DL (ref 11.5–16)
HGB UR QL STRIP: ABNORMAL
IMM GRANULOCYTES # BLD AUTO: 0 K/UL (ref 0–0.04)
IMM GRANULOCYTES NFR BLD AUTO: 0 % (ref 0–0.5)
INR PPP: 1 (ref 0.9–1.1)
KETONES UR QL STRIP.AUTO: NEGATIVE MG/DL
KOH PREP SPEC: NORMAL
LEUKOCYTE ESTERASE UR QL STRIP.AUTO: ABNORMAL
LYMPHOCYTES # BLD: 1.5 K/UL (ref 0.8–3.5)
LYMPHOCYTES NFR BLD: 24 % (ref 12–49)
MCH RBC QN AUTO: 26.8 PG (ref 26–34)
MCHC RBC AUTO-ENTMCNC: 31 G/DL (ref 30–36.5)
MCV RBC AUTO: 86.6 FL (ref 80–99)
MONOCYTES # BLD: 0.5 K/UL (ref 0–1)
MONOCYTES NFR BLD: 8 % (ref 5–13)
NEUTS SEG # BLD: 4.3 K/UL (ref 1.8–8)
NEUTS SEG NFR BLD: 65 % (ref 32–75)
NITRITE UR QL STRIP.AUTO: NEGATIVE
NRBC # BLD: 0 K/UL (ref 0–0.01)
NRBC BLD-RTO: 0 PER 100 WBC
PH UR STRIP: 6 [PH] (ref 5–8)
PLATELET # BLD AUTO: 264 K/UL (ref 150–400)
PMV BLD AUTO: 8.8 FL (ref 8.9–12.9)
POTASSIUM SERPL-SCNC: 3.9 MMOL/L (ref 3.5–5.1)
PROT SERPL-MCNC: 7.7 G/DL (ref 6.4–8.2)
PROT UR STRIP-MCNC: NEGATIVE MG/DL
PROTHROMBIN TIME: 9.8 SEC (ref 9–11.1)
RBC # BLD AUTO: 4.4 M/UL (ref 3.8–5.2)
RBC #/AREA URNS HPF: ABNORMAL /HPF (ref 0–5)
SAMPLES BEING HELD,HOLD: NORMAL
SERVICE CMNT-IMP: NORMAL
SODIUM SERPL-SCNC: 141 MMOL/L (ref 136–145)
SP GR UR REFRACTOMETRY: 1.02 (ref 1–1.03)
T VAGINALIS VAG QL WET PREP: NORMAL
UR CULT HOLD, URHOLD: NORMAL
UROBILINOGEN UR QL STRIP.AUTO: 0.2 EU/DL (ref 0.2–1)
WBC # BLD AUTO: 6.4 K/UL (ref 3.6–11)
WBC CASTS URNS QL MICRO: ABNORMAL /LPF
WBC URNS QL MICRO: >100 /HPF (ref 0–4)

## 2020-11-08 PROCEDURE — 87186 SC STD MICRODIL/AGAR DIL: CPT

## 2020-11-08 PROCEDURE — 80053 COMPREHEN METABOLIC PANEL: CPT

## 2020-11-08 PROCEDURE — 87077 CULTURE AEROBIC IDENTIFY: CPT

## 2020-11-08 PROCEDURE — 85610 PROTHROMBIN TIME: CPT

## 2020-11-08 PROCEDURE — 76856 US EXAM PELVIC COMPLETE: CPT

## 2020-11-08 PROCEDURE — 85025 COMPLETE CBC W/AUTO DIFF WBC: CPT

## 2020-11-08 PROCEDURE — 81001 URINALYSIS AUTO W/SCOPE: CPT

## 2020-11-08 PROCEDURE — 76830 TRANSVAGINAL US NON-OB: CPT

## 2020-11-08 PROCEDURE — 36415 COLL VENOUS BLD VENIPUNCTURE: CPT

## 2020-11-08 PROCEDURE — 99284 EMERGENCY DEPT VISIT MOD MDM: CPT

## 2020-11-08 PROCEDURE — 87086 URINE CULTURE/COLONY COUNT: CPT

## 2020-11-08 PROCEDURE — 87491 CHLMYD TRACH DNA AMP PROBE: CPT

## 2020-11-08 PROCEDURE — 87210 SMEAR WET MOUNT SALINE/INK: CPT

## 2020-11-08 RX ORDER — CEPHALEXIN 500 MG/1
500 CAPSULE ORAL 4 TIMES DAILY
Qty: 28 CAP | Refills: 0 | Status: SHIPPED | OUTPATIENT
Start: 2020-11-08 | End: 2020-11-15

## 2020-11-08 RX ORDER — CEPHALEXIN 500 MG/1
500 CAPSULE ORAL 4 TIMES DAILY
Qty: 28 CAP | Refills: 0 | Status: SHIPPED | OUTPATIENT
Start: 2020-11-08 | End: 2020-11-08 | Stop reason: SDUPTHER

## 2020-11-08 RX ORDER — CEPHALEXIN 250 MG/1
500 CAPSULE ORAL
Status: DISCONTINUED | OUTPATIENT
Start: 2020-11-08 | End: 2020-11-08 | Stop reason: HOSPADM

## 2020-11-08 NOTE — ED PROVIDER NOTES
49-year-old female presents with vaginal bleeding. The patient reports that her last menstrual period was approximately a year and a half ago. She has had occasional spotting after intercourse but states that the bleeding that she is experiencing now is similar to a menstrual period. She denies abdominal pain and states that she has had a tubal ligation in the past.  She is scheduled for an OB appointment in the next few months for her annual.  She denies any GI or urinary symptoms           Past Medical History:   Diagnosis Date    Asthma     Bronchitis     Fibromyalgia     Migraines     Seasonal allergic reaction        Past Surgical History:   Procedure Laterality Date    HX APPENDECTOMY      HX GASTRIC BYPASS      HX GYN      HX ORTHOPAEDIC      lumbar fusion    HX TUBAL LIGATION           History reviewed. No pertinent family history.     Social History     Socioeconomic History    Marital status: SINGLE     Spouse name: Not on file    Number of children: Not on file    Years of education: Not on file    Highest education level: Not on file   Occupational History    Not on file   Social Needs    Financial resource strain: Not on file    Food insecurity     Worry: Not on file     Inability: Not on file    Transportation needs     Medical: Not on file     Non-medical: Not on file   Tobacco Use    Smoking status: Former Smoker    Smokeless tobacco: Never Used   Substance and Sexual Activity    Alcohol use: Yes     Comment: rarely    Drug use: Yes     Types: Prescription    Sexual activity: Not on file   Lifestyle    Physical activity     Days per week: Not on file     Minutes per session: Not on file    Stress: Not on file   Relationships    Social connections     Talks on phone: Not on file     Gets together: Not on file     Attends [employer] service: Not on file     Active member of club or organization: Not on file     Attends meetings of clubs or organizations: Not on file Relationship status: Not on file    Intimate partner violence     Fear of current or ex partner: Not on file     Emotionally abused: Not on file     Physically abused: Not on file     Forced sexual activity: Not on file   Other Topics Concern    Not on file   Social History Narrative    Not on file         ALLERGIES: Lortab [hydrocodone-acetaminophen]; Nsaids (non-steroidal anti-inflammatory drug); and Percocet [oxycodone-acetaminophen]    Review of Systems   Constitutional: Negative for fever. HENT: Negative for rhinorrhea. Respiratory: Negative for shortness of breath. Cardiovascular: Negative for chest pain. Gastrointestinal: Negative for abdominal pain. Genitourinary: Positive for vaginal bleeding. Negative for dysuria. Musculoskeletal: Negative for back pain. Skin: Negative for wound. Neurological: Negative for headaches. Psychiatric/Behavioral: Negative for confusion. Vitals:    11/08/20 1400   BP: 124/71   Pulse: 82   Resp: 16   Temp: 98.7 °F (37.1 °C)   SpO2: 96%   Weight: 80 kg (176 lb 5.9 oz)            Physical Exam  Vitals signs and nursing note reviewed. Exam conducted with a chaperone present. Constitutional:       General: She is not in acute distress. Appearance: Normal appearance. She is not ill-appearing, toxic-appearing or diaphoretic. HENT:      Head: Normocephalic and atraumatic. Eyes:      Extraocular Movements: Extraocular movements intact. Neck:      Musculoskeletal: Normal range of motion. Cardiovascular:      Rate and Rhythm: Normal rate and regular rhythm. Pulses: Normal pulses. Heart sounds: Normal heart sounds. No murmur. No friction rub. No gallop. Pulmonary:      Effort: Pulmonary effort is normal. No respiratory distress. Breath sounds: Normal breath sounds. No wheezing or rales. Abdominal:      General: Abdomen is flat. Bowel sounds are normal. There is no distension. Palpations: Abdomen is soft.       Tenderness: There is no abdominal tenderness. There is no guarding. Genitourinary:     Comments: Small amount of old blood in the vaginal vault with a closed cervix. Musculoskeletal: Normal range of motion. Skin:     General: Skin is warm and dry. Neurological:      Mental Status: She is alert and oriented to person, place, and time. Psychiatric:         Mood and Affect: Mood normal.          MDM  Number of Diagnoses or Management Options  Postmenopausal vaginal bleeding:   Urinary tract infection without hematuria, site unspecified:   Diagnosis management comments: 57-year-old postmenopausal female presents with vaginal bleeding. Female nurse was present for pelvic exam which reveals old blood in the vaginal vault with no active bleeding from the cervical eyes. Ultrasound was obtained to evaluate for fibroids. Ultrasound shows no evidence of fibroids. Laboratory studies are unremarkable with a normal hemoglobin. The patient will be referred to [facility] for further evaluation. Urinalysis does show evidence of urinary tract infection she will be treated with Keflex. She is comfortable and agreeable to plan of care and aware of her return precautions.          Procedures

## 2020-11-08 NOTE — ED TRIAGE NOTES
Patient arriving c/o vaginal bleeding. Reports she's post menopausal. Also reports LLQ abdominal pain. Reports vaginal bleeding started first then noticed the abdominal pain. Reports bleeding is getting progressively worse.

## 2020-11-09 LAB
C TRACH DNA SPEC QL NAA+PROBE: NEGATIVE
N GONORRHOEA DNA SPEC QL NAA+PROBE: NEGATIVE
SAMPLE TYPE: NORMAL
SERVICE CMNT-IMP: NORMAL
SPECIMEN SOURCE: NORMAL

## 2020-11-11 LAB
BACTERIA SPEC CULT: ABNORMAL
BACTERIA SPEC CULT: ABNORMAL
CC UR VC: ABNORMAL
SERVICE CMNT-IMP: ABNORMAL

## 2020-11-11 NOTE — PROGRESS NOTES
Attempted to reach patient. Message left for call back concerning culture result.   P: continue keflex and add macrobid 100 mg bid x 7 d

## 2020-11-12 RX ORDER — NITROFURANTOIN 25; 75 MG/1; MG/1
100 CAPSULE ORAL 2 TIMES DAILY
Qty: 14 CAP | Refills: 0 | Status: SHIPPED | OUTPATIENT
Start: 2020-11-12 | End: 2020-11-19

## 2020-11-12 NOTE — PROGRESS NOTES
I spoke with patient concerning results. She will continue keflex.   [facility] for macrobid 100 mg bid x 7 d to pharmacy on record in [place] per pt request

## 2020-12-07 ENCOUNTER — HOSPITAL ENCOUNTER (EMERGENCY)
Age: 50
Discharge: HOME | End: 2020-12-07
Attending: EMERGENCY MEDICINE
Payer: MEDICARE

## 2020-12-07 VITALS
WEIGHT: 169.31 LBS | OXYGEN SATURATION: 99 % | HEIGHT: 66 IN | TEMPERATURE: 98.2 F | RESPIRATION RATE: 16 BRPM | DIASTOLIC BLOOD PRESSURE: 89 MMHG | SYSTOLIC BLOOD PRESSURE: 130 MMHG | BODY MASS INDEX: 27.21 KG/M2 | HEART RATE: 61 BPM

## 2020-12-07 DIAGNOSIS — R51.9 NONINTRACTABLE HEADACHE, UNSPECIFIED CHRONICITY PATTERN, UNSPECIFIED HEADACHE TYPE: Primary | ICD-10-CM

## 2020-12-07 LAB
COMMENT, HOLDF: NORMAL
SAMPLES BEING HELD,HOLD: NORMAL

## 2020-12-07 PROCEDURE — 74011250636 HC RX REV CODE- 250/636: Performed by: EMERGENCY MEDICINE

## 2020-12-07 PROCEDURE — 96374 THER/PROPH/DIAG INJ IV PUSH: CPT

## 2020-12-07 PROCEDURE — 99283 EMERGENCY DEPT VISIT LOW MDM: CPT

## 2020-12-07 PROCEDURE — 96375 TX/PRO/DX INJ NEW DRUG ADDON: CPT

## 2020-12-07 PROCEDURE — 36415 COLL VENOUS BLD VENIPUNCTURE: CPT

## 2020-12-07 PROCEDURE — 74011000250 HC RX REV CODE- 250: Performed by: EMERGENCY MEDICINE

## 2020-12-07 RX ORDER — PREDNISONE 20 MG/1
TABLET ORAL
Qty: 20 TAB | Refills: 0 | Status: SHIPPED | OUTPATIENT
Start: 2020-12-07 | End: 2021-09-13

## 2020-12-07 RX ORDER — DEXAMETHASONE SODIUM PHOSPHATE 10 MG/ML
10 INJECTION INTRAMUSCULAR; INTRAVENOUS ONCE
Status: COMPLETED | OUTPATIENT
Start: 2020-12-07 | End: 2020-12-07

## 2020-12-07 RX ORDER — METOCLOPRAMIDE HYDROCHLORIDE 5 MG/ML
10 INJECTION INTRAMUSCULAR; INTRAVENOUS ONCE
Status: COMPLETED | OUTPATIENT
Start: 2020-12-07 | End: 2020-12-07

## 2020-12-07 RX ORDER — KETOROLAC TROMETHAMINE 30 MG/ML
30 INJECTION, SOLUTION INTRAMUSCULAR; INTRAVENOUS
Status: COMPLETED | OUTPATIENT
Start: 2020-12-07 | End: 2020-12-07

## 2020-12-07 RX ORDER — PROCHLORPERAZINE EDISYLATE 5 MG/ML
10 INJECTION INTRAMUSCULAR; INTRAVENOUS
Status: DISCONTINUED | OUTPATIENT
Start: 2020-12-07 | End: 2020-12-07 | Stop reason: SDUPTHER

## 2020-12-07 RX ORDER — DIPHENHYDRAMINE HYDROCHLORIDE 50 MG/ML
25 INJECTION, SOLUTION INTRAMUSCULAR; INTRAVENOUS
Status: COMPLETED | OUTPATIENT
Start: 2020-12-07 | End: 2020-12-07

## 2020-12-07 RX ADMIN — KETOROLAC TROMETHAMINE 30 MG: 30 INJECTION, SOLUTION INTRAMUSCULAR at 08:10

## 2020-12-07 RX ADMIN — DEXAMETHASONE SODIUM PHOSPHATE 10 MG: 10 INJECTION, SOLUTION INTRAMUSCULAR; INTRAVENOUS at 08:08

## 2020-12-07 RX ADMIN — PROCHLORPERAZINE EDISYLATE 10 MG: 5 INJECTION INTRAMUSCULAR; INTRAVENOUS at 09:40

## 2020-12-07 RX ADMIN — DIPHENHYDRAMINE HYDROCHLORIDE 25 MG: 50 INJECTION, SOLUTION INTRAMUSCULAR; INTRAVENOUS at 08:06

## 2020-12-07 RX ADMIN — SODIUM CHLORIDE 1000 ML: 900 INJECTION, SOLUTION INTRAVENOUS at 08:05

## 2020-12-07 RX ADMIN — METOCLOPRAMIDE 10 MG: 5 INJECTION, SOLUTION INTRAMUSCULAR; INTRAVENOUS at 08:09

## 2020-12-07 NOTE — ED PROVIDER NOTES
HPI the patient has a migraine headache that started yesterday. She also complains of diffuse myalgias, she has had episodes like this in the past but this is more severe than normal.  She also has had occasional episodes of vomiting and complains of sinus pressure which is a common problem for her. She denies having fever, cough, shortness of breath or other complaints. Past Medical History:   Diagnosis Date    Asthma     Bronchitis     Fibromyalgia     Migraines     Seasonal allergic reaction        Past Surgical History:   Procedure Laterality Date    HX APPENDECTOMY      HX GASTRIC BYPASS      HX GYN      HX ORTHOPAEDIC      lumbar fusion    HX TUBAL LIGATION           History reviewed. No pertinent family history.     Social History     Socioeconomic History    Marital status: SINGLE     Spouse name: Not on file    Number of children: Not on file    Years of education: Not on file    Highest education level: Not on file   Occupational History    Not on file   Social Needs    Financial resource strain: Not on file    Food insecurity     Worry: Not on file     Inability: Not on file    Transportation needs     Medical: Not on file     Non-medical: Not on file   Tobacco Use    Smoking status: Former Smoker    Smokeless tobacco: Never Used   Substance and Sexual Activity    Alcohol use: Yes     Comment: rarely    Drug use: Yes     Types: Prescription    Sexual activity: Not on file   Lifestyle    Physical activity     Days per week: Not on file     Minutes per session: Not on file    Stress: Not on file   Relationships    Social connections     Talks on phone: Not on file     Gets together: Not on file     Attends [employer] service: Not on file     Active member of club or organization: Not on file     Attends meetings of clubs or organizations: Not on file     Relationship status: Not on file    Intimate partner violence     Fear of current or ex partner: Not on file     Emotionally abused: Not on file     Physically abused: Not on file     Forced sexual activity: Not on file   Other Topics Concern    Not on file   Social History Narrative    Not on file         ALLERGIES: Lortab [hydrocodone-acetaminophen]; Nsaids (non-steroidal anti-inflammatory drug); and Percocet [oxycodone-acetaminophen]    Review of Systems   Constitutional: Negative for fever. HENT: Positive for sinus pressure. Negative for voice change. Eyes: Negative for pain. Respiratory: Negative for cough and shortness of breath. Cardiovascular: Negative for chest pain. Gastrointestinal: Negative for abdominal pain, nausea and vomiting. Genitourinary: Negative for flank pain. Musculoskeletal: Positive for myalgias. Negative for back pain. Skin: Negative for rash. Neurological: Positive for headaches. Psychiatric/Behavioral: Negative for confusion. Vitals:    12/07/20 0742   BP: 123/80   Pulse: 75   Resp: 16   Temp: 97.6 °F (36.4 °C)   SpO2: 99%   Weight: 76.8 kg (169 lb 5 oz)   Height: 5' 6\" (1.676 m)            Physical Exam  Constitutional:       General: She is not in acute distress. Appearance: She is well-developed. HENT:      Head: Normocephalic. Eyes:      Pupils: Pupils are equal, round, and reactive to light. Neck:      Musculoskeletal: Normal range of motion. Cardiovascular:      Rate and Rhythm: Normal rate. Heart sounds: No murmur. Pulmonary:      Effort: Pulmonary effort is normal.      Breath sounds: Normal breath sounds. Abdominal:      Palpations: Abdomen is soft. Tenderness: There is no abdominal tenderness. Musculoskeletal: Normal range of motion. Skin:     General: Skin is warm and dry. Capillary Refill: Capillary refill takes less than 2 seconds. Neurological:      General: No focal deficit present. Mental Status: She is alert and oriented to person, place, and time. Cranial Nerves: No cranial nerve deficit.    Psychiatric: Behavior: Behavior normal.          MDM       Procedures      10 AM--patient is feeling much better now and is ready for discharge.

## 2020-12-07 NOTE — ED TRIAGE NOTES
Pt states that she has fibromyalgia and she has the worst pain she has ever had all over her body that started last night, pt states that she also has a migraine. Pt states that she has never had a flare up this bad. Pt states that it is hard to breathe because her ribs hurt really bad.

## 2020-12-07 NOTE — DISCHARGE INSTRUCTIONS
Patient Education   Headache: After Your Visit to the Emergency Room  Your Care Instructions  Headaches have many possible causes. Most headaches are not a sign of serious problems and will get better on their own. Home treatment may help you feel better soon. Even though you have been released from the emergency room, you still need to watch for any problems. The doctor carefully checked you. But sometimes problems can develop later. If you have new symptoms, or if your symptoms do not get better, return to the emergency room or call your doctor right away. A visit to the emergency room is only one step in your treatment. Even if you feel better, you still need to do what your doctor recommends, such as going to all suggested follow-up appointments and taking medicines exactly as directed. This will help you recover and help prevent future problems. How can you care for yourself at home? · Have someone drive you home if you have taken pain medicine. Do not drive yourself. · Rest in a quiet, dark room until your headache is gone. Close your eyes, and try to relax or go to sleep. Do not watch TV or read. · Put ice or a cold pack on the area for 10 to 20 minutes at a time. Put a thin cloth between the ice and your skin. · Use a warm, moist towel or a heating pad set on low to relax tight shoulder and neck muscles. · Have someone gently massage your neck and shoulders. · Take pain medicines exactly as directed. ¨ If the doctor gave you a prescription medicine for pain, take it as prescribed. ¨ If you are not taking a prescription pain medicine, ask your doctor if you can take an over-the-counter medicine. · Limit your caffeine intake to 1 to 2 cups a day. People who drink a lot of caffeine often get a headache several hours after their last drink of caffeine. Or they may wake up with a headache that is relieved by drinking caffeine. Cut down slowly to avoid caffeine-withdrawal headaches.   · Seek help if you have depression or anxiety. Your headaches may be linked to these conditions. When should you call for help? Call 911 if:  · You have signs of a stroke. These may include:  ¨ Sudden numbness, paralysis, or weakness in your face, arm, or leg, especially on only one side of your body. ¨ New problems with walking or balance. ¨ Sudden vision changes. ¨ Drooling or slurred speech. ¨ New problems speaking or understanding simple statements, or feeling confused. ¨ A sudden, severe headache that is different from past headaches. · You have other symptoms that you think are a medical emergency. Return to the emergency room now if:  · You get a fever and a stiff neck. · Your headache gets worse. · You have new nausea and vomiting, or you cannot keep down food or liquids. Call your doctor today if:  · Your headache does not get better within 1 to 2 days. · Your headaches get worse or happen more often. Where can you learn more? Go to [employer]  Enter D038 in the search box to learn more about \"Headache: After Your Visit to the Emergency Room. \"   © 8481-4181 [employer]. Care instructions adapted under license by [facility] (which disclaims liability or warranty for this information). This care instruction is for use with your licensed healthcare professional. If you have questions about a medical condition or this instruction, always ask your healthcare professional. [address] any warranty or liability for your use of this information.   Content Version: 9.3.89171; Last Revised: October 20, 2011

## 2021-09-08 ENCOUNTER — APPOINTMENT (OUTPATIENT)
Dept: CT IMAGING | Age: 51
End: 2021-09-08
Attending: EMERGENCY MEDICINE
Payer: MEDICARE

## 2021-09-08 ENCOUNTER — HOSPITAL ENCOUNTER (EMERGENCY)
Age: 51
Discharge: HOME | End: 2021-09-08
Attending: EMERGENCY MEDICINE
Payer: MEDICARE

## 2021-09-08 VITALS
RESPIRATION RATE: 18 BRPM | BODY MASS INDEX: 27.93 KG/M2 | SYSTOLIC BLOOD PRESSURE: 137 MMHG | HEART RATE: 94 BPM | OXYGEN SATURATION: 97 % | DIASTOLIC BLOOD PRESSURE: 85 MMHG | WEIGHT: 173.06 LBS | TEMPERATURE: 98.1 F

## 2021-09-08 DIAGNOSIS — T50.Z95A SIDE EFFECTS OF VACCINATION, INITIAL ENCOUNTER: Primary | ICD-10-CM

## 2021-09-08 LAB
ALBUMIN SERPL-MCNC: 3.9 G/DL (ref 3.5–5)
ALBUMIN/GLOB SERPL: 1.1 {RATIO} (ref 1.1–2.2)
ALP SERPL-CCNC: 132 U/L (ref 45–117)
ALT SERPL-CCNC: 27 U/L (ref 12–78)
ANION GAP SERPL CALC-SCNC: 11 MMOL/L (ref 5–15)
AST SERPL-CCNC: 28 U/L (ref 15–37)
ATRIAL RATE: 87 BPM
BASOPHILS # BLD: 0.1 K/UL (ref 0–0.1)
BASOPHILS NFR BLD: 1 % (ref 0–1)
BILIRUB SERPL-MCNC: 0.2 MG/DL (ref 0.2–1)
BUN SERPL-MCNC: 8 MG/DL (ref 6–20)
BUN/CREAT SERPL: 11 (ref 12–20)
CALCIUM SERPL-MCNC: 9.3 MG/DL (ref 8.5–10.1)
CALCULATED P AXIS, ECG09: 56 DEGREES
CALCULATED R AXIS, ECG10: 37 DEGREES
CALCULATED T AXIS, ECG11: 31 DEGREES
CHLORIDE SERPL-SCNC: 107 MMOL/L (ref 97–108)
CK SERPL-CCNC: 174 U/L (ref 26–192)
CO2 SERPL-SCNC: 28 MMOL/L (ref 21–32)
COMMENT, HOLDF: NORMAL
CREAT SERPL-MCNC: 0.72 MG/DL (ref 0.55–1.02)
DIAGNOSIS, 93000: NORMAL
DIFFERENTIAL METHOD BLD: ABNORMAL
EOSINOPHIL # BLD: 0.3 K/UL (ref 0–0.4)
EOSINOPHIL NFR BLD: 4 % (ref 0–7)
ERYTHROCYTE [DISTWIDTH] IN BLOOD BY AUTOMATED COUNT: 13.2 % (ref 11.5–14.5)
GLOBULIN SER CALC-MCNC: 3.4 G/DL (ref 2–4)
GLUCOSE SERPL-MCNC: 99 MG/DL (ref 65–100)
HCT VFR BLD AUTO: 39.6 % (ref 35–47)
HGB BLD-MCNC: 11.9 G/DL (ref 11.5–16)
IMM GRANULOCYTES # BLD AUTO: 0 K/UL (ref 0–0.04)
IMM GRANULOCYTES NFR BLD AUTO: 0 % (ref 0–0.5)
LYMPHOCYTES # BLD: 1.7 K/UL (ref 0.8–3.5)
LYMPHOCYTES NFR BLD: 24 % (ref 12–49)
MCH RBC QN AUTO: 25.4 PG (ref 26–34)
MCHC RBC AUTO-ENTMCNC: 30.1 G/DL (ref 30–36.5)
MCV RBC AUTO: 84.4 FL (ref 80–99)
MONOCYTES # BLD: 0.7 K/UL (ref 0–1)
MONOCYTES NFR BLD: 10 % (ref 5–13)
NEUTS SEG # BLD: 4.3 K/UL (ref 1.8–8)
NEUTS SEG NFR BLD: 61 % (ref 32–75)
NRBC # BLD: 0 K/UL (ref 0–0.01)
NRBC BLD-RTO: 0 PER 100 WBC
P-R INTERVAL, ECG05: 112 MS
PLATELET # BLD AUTO: 245 K/UL (ref 150–400)
PMV BLD AUTO: 8.6 FL (ref 8.9–12.9)
POTASSIUM SERPL-SCNC: 4.2 MMOL/L (ref 3.5–5.1)
PROT SERPL-MCNC: 7.3 G/DL (ref 6.4–8.2)
Q-T INTERVAL, ECG07: 378 MS
QRS DURATION, ECG06: 74 MS
QTC CALCULATION (BEZET), ECG08: 454 MS
RBC # BLD AUTO: 4.69 M/UL (ref 3.8–5.2)
SAMPLES BEING HELD,HOLD: NORMAL
SODIUM SERPL-SCNC: 146 MMOL/L (ref 136–145)
VENTRICULAR RATE, ECG03: 87 BPM
WBC # BLD AUTO: 7 K/UL (ref 3.6–11)

## 2021-09-08 PROCEDURE — 96374 THER/PROPH/DIAG INJ IV PUSH: CPT

## 2021-09-08 PROCEDURE — 99283 EMERGENCY DEPT VISIT LOW MDM: CPT

## 2021-09-08 PROCEDURE — 93005 ELECTROCARDIOGRAM TRACING: CPT

## 2021-09-08 PROCEDURE — 96375 TX/PRO/DX INJ NEW DRUG ADDON: CPT

## 2021-09-08 PROCEDURE — 74177 CT ABD & PELVIS W/CONTRAST: CPT

## 2021-09-08 PROCEDURE — 80053 COMPREHEN METABOLIC PANEL: CPT

## 2021-09-08 PROCEDURE — 74011000636 HC RX REV CODE- 636: Performed by: EMERGENCY MEDICINE

## 2021-09-08 PROCEDURE — 36415 COLL VENOUS BLD VENIPUNCTURE: CPT

## 2021-09-08 PROCEDURE — 82550 ASSAY OF CK (CPK): CPT

## 2021-09-08 PROCEDURE — 74011250636 HC RX REV CODE- 250/636: Performed by: EMERGENCY MEDICINE

## 2021-09-08 PROCEDURE — 85025 COMPLETE CBC W/AUTO DIFF WBC: CPT

## 2021-09-08 RX ORDER — ONDANSETRON 2 MG/ML
4 INJECTION INTRAMUSCULAR; INTRAVENOUS
Status: COMPLETED | OUTPATIENT
Start: 2021-09-08 | End: 2021-09-08

## 2021-09-08 RX ORDER — MORPHINE SULFATE 2 MG/ML
2 INJECTION, SOLUTION INTRAMUSCULAR; INTRAVENOUS ONCE
Status: COMPLETED | OUTPATIENT
Start: 2021-09-08 | End: 2021-09-08

## 2021-09-08 RX ADMIN — MORPHINE SULFATE 2 MG: 2 INJECTION, SOLUTION INTRAMUSCULAR; INTRAVENOUS at 19:00

## 2021-09-08 RX ADMIN — ONDANSETRON 4 MG: 2 INJECTION INTRAMUSCULAR; INTRAVENOUS at 18:30

## 2021-09-08 RX ADMIN — IOPAMIDOL 100 ML: 755 INJECTION, SOLUTION INTRAVENOUS at 19:23

## 2021-09-08 RX ADMIN — SODIUM CHLORIDE 1000 ML: 9 INJECTION, SOLUTION INTRAVENOUS at 18:34

## 2021-09-08 NOTE — ED TRIAGE NOTES
TRIAGE NOTE:  Doc Gins down steps today around 1000.  6 steps onto wood floor.   Has right knee, right palm, right arm, lower back pain (had spinal fusion hx)      Sunday had 2nd covid shot, \"high fever, dizziness, diarrhea, vomiting, nausea\"    Took tylenol

## 2021-09-08 NOTE — ED PROVIDER NOTES
HPI   The patient is a 57-year-old white female who just received her second Covid vaccination 3 days ago. The next day she started to feel bad with general malaise vomiting headache some redness around the injection site and feeling bad all over. She states she felt very emotional and she has a history of fibromyalgias as well. She also fell during this episode and is complaining of some low back pain secondary to the fall. Past Medical History:   Diagnosis Date    Asthma     Bronchitis     Fibromyalgia     Migraines     Seasonal allergic reaction        Past Surgical History:   Procedure Laterality Date    HX APPENDECTOMY      HX GASTRIC BYPASS      HX GYN      HX ORTHOPAEDIC      lumbar fusion    HX TUBAL LIGATION           No family history on file. Social History     Socioeconomic History    Marital status: SINGLE     Spouse name: Not on file    Number of children: Not on file    Years of education: Not on file    Highest education level: Not on file   Occupational History    Not on file   Tobacco Use    Smoking status: Former Smoker    Smokeless tobacco: Never Used   Substance and Sexual Activity    Alcohol use: Yes     Comment: rarely    Drug use: Yes     Types: Prescription    Sexual activity: Not on file   Other Topics Concern    Not on file   Social History Narrative    Not on file     Social Determinants of Health     Financial Resource Strain:     Difficulty of Paying Living Expenses:    Food Insecurity:     Worried About Running Out of Food in the Last Year:     [address] in the Last Year:    Transportation Needs:     Lack of Transportation (Medical):      Lack of Transportation (Non-Medical):    Physical Activity:     Days of Exercise per Week:     Minutes of Exercise per Session:    Stress:     Feeling of Stress :    Social Connections:     Frequency of Communication with Friends and Family:     Frequency of Social Gatherings with Friends and Family:     Attends Jehovah's witness Services:     Active Member of Clubs or Organizations:     Attends Club or Organization Meetings:     Marital Status:    Intimate Partner Violence:     Fear of Current or Ex-Partner:     Emotionally Abused:     Physically Abused:     Sexually Abused: ALLERGIES: Lortab [hydrocodone-acetaminophen], Nsaids (non-steroidal anti-inflammatory drug), and Percocet [oxycodone-acetaminophen]    Review of Systems   All other systems reviewed and are negative. Vitals:    09/08/21 1817   BP: 137/85   Pulse: 94   Resp: 18   Temp: 98.1 °F (36.7 °C)   SpO2: 97%   Weight: 78.5 kg (173 lb 1 oz)            Physical Exam  Vitals reviewed. HENT:      Head: Normocephalic. Nose: Nose normal.      Mouth/Throat:      Mouth: Mucous membranes are moist.   Eyes:      Pupils: Pupils are equal, round, and reactive to light. Cardiovascular:      Rate and Rhythm: Normal rate. Pulmonary:      Effort: Pulmonary effort is normal.   Abdominal:      General: Abdomen is flat. Musculoskeletal:         General: Normal range of motion. Skin:     General: Skin is warm. Capillary Refill: Capillary refill takes less than 2 seconds. Neurological:      General: No focal deficit present. Mental Status: She is alert. Mental status is at baseline. Psychiatric:         Mood and Affect: Mood normal.         Thought Content:  Thought content normal.         Judgment: Judgment normal.          MDM       Procedures

## 2021-09-09 ENCOUNTER — HOSPITAL ENCOUNTER (EMERGENCY)
Age: 51
Discharge: HOME | End: 2021-09-10
Attending: EMERGENCY MEDICINE
Payer: MEDICARE

## 2021-09-09 DIAGNOSIS — R19.7 DIARRHEA, UNSPECIFIED TYPE: ICD-10-CM

## 2021-09-09 DIAGNOSIS — R11.2 NON-INTRACTABLE VOMITING WITH NAUSEA, UNSPECIFIED VOMITING TYPE: Primary | ICD-10-CM

## 2021-09-09 PROCEDURE — 96376 TX/PRO/DX INJ SAME DRUG ADON: CPT

## 2021-09-09 PROCEDURE — 93005 ELECTROCARDIOGRAM TRACING: CPT

## 2021-09-09 PROCEDURE — 99285 EMERGENCY DEPT VISIT HI MDM: CPT

## 2021-09-09 PROCEDURE — 96374 THER/PROPH/DIAG INJ IV PUSH: CPT

## 2021-09-09 RX ORDER — SODIUM CHLORIDE 0.9 % (FLUSH) 0.9 %
5-40 SYRINGE (ML) INJECTION AS NEEDED
Status: DISCONTINUED | OUTPATIENT
Start: 2021-09-09 | End: 2021-09-10 | Stop reason: HOSPADM

## 2021-09-09 RX ORDER — ONDANSETRON 2 MG/ML
4 INJECTION INTRAMUSCULAR; INTRAVENOUS
Status: COMPLETED | OUTPATIENT
Start: 2021-09-10 | End: 2021-09-10

## 2021-09-09 RX ORDER — SODIUM CHLORIDE 0.9 % (FLUSH) 0.9 %
5-40 SYRINGE (ML) INJECTION EVERY 8 HOURS
Status: DISCONTINUED | OUTPATIENT
Start: 2021-09-10 | End: 2021-09-10 | Stop reason: HOSPADM

## 2021-09-10 VITALS
WEIGHT: 171.74 LBS | BODY MASS INDEX: 27.72 KG/M2 | SYSTOLIC BLOOD PRESSURE: 132 MMHG | RESPIRATION RATE: 19 BRPM | DIASTOLIC BLOOD PRESSURE: 78 MMHG | OXYGEN SATURATION: 93 % | TEMPERATURE: 98.2 F | HEART RATE: 100 BPM

## 2021-09-10 LAB
ATRIAL RATE: 98 BPM
BASOPHILS # BLD: 0.1 K/UL (ref 0–0.1)
BASOPHILS NFR BLD: 2 % (ref 0–1)
CA-I BLD-MCNC: 1.18 MMOL/L (ref 1.12–1.32)
CALCULATED P AXIS, ECG09: 66 DEGREES
CALCULATED R AXIS, ECG10: 49 DEGREES
CALCULATED T AXIS, ECG11: 5 DEGREES
CHLORIDE BLD-SCNC: 110 MMOL/L (ref 100–108)
CREAT UR-MCNC: 0.5 MG/DL (ref 0.6–1.3)
DIAGNOSIS, 93000: NORMAL
DIFFERENTIAL METHOD BLD: ABNORMAL
EOSINOPHIL # BLD: 0 K/UL (ref 0–0.4)
EOSINOPHIL NFR BLD: 0 % (ref 0–7)
ERYTHROCYTE [DISTWIDTH] IN BLOOD BY AUTOMATED COUNT: 13.2 % (ref 11.5–14.5)
GLUCOSE BLD STRIP.AUTO-MCNC: 114 MG/DL (ref 74–106)
HCT VFR BLD AUTO: 39.8 % (ref 35–47)
HGB BLD-MCNC: 11.8 G/DL (ref 11.5–16)
IMM GRANULOCYTES # BLD AUTO: 0 K/UL
IMM GRANULOCYTES NFR BLD AUTO: 0 %
LACTATE BLD-SCNC: 0.68 MMOL/L (ref 0.4–2)
LYMPHOCYTES # BLD: 0.7 K/UL (ref 0.8–3.5)
LYMPHOCYTES NFR BLD: 14 % (ref 12–49)
MCH RBC QN AUTO: 25.2 PG (ref 26–34)
MCHC RBC AUTO-ENTMCNC: 29.6 G/DL (ref 30–36.5)
MCV RBC AUTO: 85 FL (ref 80–99)
MONOCYTES # BLD: 0.4 K/UL (ref 0–1)
MONOCYTES NFR BLD: 8 % (ref 5–13)
NEUTS BAND NFR BLD MANUAL: 2 % (ref 0–6)
NEUTS SEG # BLD: 4.1 K/UL (ref 1.8–8)
NEUTS SEG NFR BLD: 74 % (ref 32–75)
NRBC # BLD: 0 K/UL (ref 0–0.01)
NRBC BLD-RTO: 0 PER 100 WBC
P-R INTERVAL, ECG05: 128 MS
PLATELET # BLD AUTO: 217 K/UL (ref 150–400)
PMV BLD AUTO: 8.8 FL (ref 8.9–12.9)
POTASSIUM BLD-SCNC: 3.8 MMOL/L (ref 3.5–5.5)
Q-T INTERVAL, ECG07: 354 MS
QRS DURATION, ECG06: 82 MS
QTC CALCULATION (BEZET), ECG08: 451 MS
RBC # BLD AUTO: 4.68 M/UL (ref 3.8–5.2)
RBC MORPH BLD: ABNORMAL
SODIUM BLD-SCNC: 144 MMOL/L (ref 136–145)
TOTAL CELLS COUNTED SPEC: 50
VENTRICULAR RATE, ECG03: 98 BPM
WBC # BLD AUTO: 5.3 K/UL (ref 3.6–11)

## 2021-09-10 PROCEDURE — 83605 ASSAY OF LACTIC ACID: CPT

## 2021-09-10 PROCEDURE — 85025 COMPLETE CBC W/AUTO DIFF WBC: CPT

## 2021-09-10 PROCEDURE — 36415 COLL VENOUS BLD VENIPUNCTURE: CPT

## 2021-09-10 PROCEDURE — 74011250636 HC RX REV CODE- 250/636: Performed by: EMERGENCY MEDICINE

## 2021-09-10 PROCEDURE — 80047 BASIC METABLC PNL IONIZED CA: CPT

## 2021-09-10 RX ORDER — ONDANSETRON 2 MG/ML
4 INJECTION INTRAMUSCULAR; INTRAVENOUS
Status: COMPLETED | OUTPATIENT
Start: 2021-09-10 | End: 2021-09-10

## 2021-09-10 RX ORDER — ONDANSETRON 4 MG/1
4 TABLET, ORALLY DISINTEGRATING ORAL
Qty: 12 TABLET | Refills: 0 | Status: SHIPPED | OUTPATIENT
Start: 2021-09-10 | End: 2021-09-13

## 2021-09-10 RX ADMIN — ONDANSETRON 4 MG: 2 INJECTION INTRAMUSCULAR; INTRAVENOUS at 00:31

## 2021-09-10 RX ADMIN — SODIUM CHLORIDE 1000 ML: 9 INJECTION, SOLUTION INTRAVENOUS at 00:32

## 2021-09-10 RX ADMIN — SODIUM CHLORIDE 1000 ML: 9 INJECTION, SOLUTION INTRAVENOUS at 00:30

## 2021-09-10 RX ADMIN — ONDANSETRON 4 MG: 2 INJECTION INTRAMUSCULAR; INTRAVENOUS at 02:17

## 2021-09-10 NOTE — ED PROVIDER NOTES
History of \"severe\" fibromyalgia (disabled), migraines, asthma. She states that she received her second Moderna vaccine 4 days ago and has been sick ever since. She states that she began having diarrhea later that night. She estimates greater than 25 episodes per day since then. She has also had nausea and vomiting over the past 3 days. Now she is mainly just dry heaving. Her mouth is felt extremely dry. She was seen yesterday after she fell down some steps. She had an extensive evaluation including an abdominal scan. She has continued to have dizziness. She has been running fevers. She complains of generalized abdominal pain. She tried antidiarrhea pills without relief. Past Medical History:   Diagnosis Date    Asthma     Bronchitis     Fibromyalgia     Migraines     Seasonal allergic reaction        Past Surgical History:   Procedure Laterality Date    HX APPENDECTOMY      HX GASTRIC BYPASS      HX GYN      HX ORTHOPAEDIC      lumbar fusion    HX TUBAL LIGATION           No family history on file. Social History     Socioeconomic History    Marital status: SINGLE     Spouse name: Not on file    Number of children: Not on file    Years of education: Not on file    Highest education level: Not on file   Occupational History    Not on file   Tobacco Use    Smoking status: Former Smoker    Smokeless tobacco: Never Used   Substance and Sexual Activity    Alcohol use: Yes     Comment: rarely    Drug use: Yes     Types: Prescription    Sexual activity: Not on file   Other Topics Concern    Not on file   Social History Narrative    Not on file     Social Determinants of Health     Financial Resource Strain:     Difficulty of Paying Living Expenses:    Food Insecurity:     Worried About Running Out of Food in the Last Year:     [address] in the Last Year:    Transportation Needs:     Lack of Transportation (Medical):      Lack of Transportation (Non-Medical): Physical Activity:     Days of Exercise per Week:     Minutes of Exercise per Session:    Stress:     Feeling of Stress :    Social Connections:     Frequency of Communication with Friends and Family:     Frequency of Social Gatherings with Friends and Family:     Attends Sabianist Services:     Active Member of Clubs or Organizations:     Attends Club or Organization Meetings:     Marital Status:    Intimate Partner Violence:     Fear of Current or Ex-Partner:     Emotionally Abused:     Physically Abused:     Sexually Abused: ALLERGIES: Lortab [hydrocodone-acetaminophen], Nsaids (non-steroidal anti-inflammatory drug), and Percocet [oxycodone-acetaminophen]    Review of Systems   All other systems reviewed and are negative. Vitals:    09/09/21 2336   BP: 104/77   Pulse: 98   Resp: 16   Temp: 98.5 °F (36.9 °C)   SpO2: 100%   Weight: 77.9 kg (171 lb 11.8 oz)            Physical Exam  Vitals and nursing note reviewed. Constitutional:       Appearance: She is well-developed. Comments: Mildly ill-appearing. HENT:      Head: Normocephalic and atraumatic. Eyes:      Conjunctiva/sclera: Conjunctivae normal.   Neck:      Trachea: No tracheal deviation. Cardiovascular:      Rate and Rhythm: Regular rhythm. Tachycardia present. Heart sounds: Normal heart sounds. No murmur heard. No friction rub. No gallop. Pulmonary:      Effort: Pulmonary effort is normal.      Breath sounds: Normal breath sounds. Abdominal:      Palpations: Abdomen is soft. Comments: Generalized tenderness. Musculoskeletal:         General: No deformity. Cervical back: Neck supple. Skin:     General: Skin is warm and dry. Neurological:      Mental Status: She is alert. Comments: oriented          MDM       Procedures    EKG: Normal sinus rhythm; rate of 98; nonspecific ST, T wave abnormalities.   Lily Valdez MD  12:00 AM    Progress Note:  Results, treatment, and follow up plan have been discussed with patient. Questions were answered. She feels much better at the time of discharge. Reyna Morel MD  3:43 AM    Assessment/plan: Vomiting and diarrhea -? Viral gastroenteritis; reassuring appearance/exam with stable vital signs. She was seen yesterday for similar symptoms and had a negative CT scan of her abdomen done at that time. She received 2 L of fluid in the ED this morning and feels better after the fluids and Zofran. CBC, Chem-8 okay. Home with Zofran and PCP follow-up. Return precautions discussed.   Reyna Morel MD  3:46 AM

## 2021-09-10 NOTE — ED NOTES
Patient  given copy of dc instructions and  script(s). Patient verbalized understanding of instructions and script (s). Patient alert and oriented and in no acute distress.   Patient discharged home ambulatory with family member

## 2021-09-10 NOTE — ED TRIAGE NOTES
Pt arrives via EMS reporting NVD and dizziness that began after receiving her Moderna vaccine Sunday. Pt seen here for the same yesterday.

## 2021-09-13 ENCOUNTER — APPOINTMENT (OUTPATIENT)
Dept: GENERAL RADIOLOGY | Age: 51
End: 2021-09-13
Attending: EMERGENCY MEDICINE
Payer: MEDICARE

## 2021-09-13 ENCOUNTER — HOSPITAL ENCOUNTER (EMERGENCY)
Age: 51
Discharge: HOME | End: 2021-09-13
Attending: EMERGENCY MEDICINE
Payer: MEDICARE

## 2021-09-13 VITALS
BODY MASS INDEX: 26.69 KG/M2 | OXYGEN SATURATION: 97 % | RESPIRATION RATE: 16 BRPM | DIASTOLIC BLOOD PRESSURE: 75 MMHG | SYSTOLIC BLOOD PRESSURE: 115 MMHG | TEMPERATURE: 98.5 F | WEIGHT: 165.34 LBS | HEART RATE: 91 BPM

## 2021-09-13 DIAGNOSIS — R07.9 CHEST PAIN, UNSPECIFIED TYPE: ICD-10-CM

## 2021-09-13 DIAGNOSIS — M79.7 FIBROMYALGIA: Primary | ICD-10-CM

## 2021-09-13 DIAGNOSIS — R51.9 NONINTRACTABLE HEADACHE, UNSPECIFIED CHRONICITY PATTERN, UNSPECIFIED HEADACHE TYPE: ICD-10-CM

## 2021-09-13 DIAGNOSIS — Z92.29 COVID-19 VACCINE SERIES COMPLETED: ICD-10-CM

## 2021-09-13 DIAGNOSIS — R06.00 DYSPNEA, UNSPECIFIED TYPE: ICD-10-CM

## 2021-09-13 DIAGNOSIS — R53.1 WEAKNESS: ICD-10-CM

## 2021-09-13 DIAGNOSIS — R10.84 ABDOMINAL PAIN, GENERALIZED: ICD-10-CM

## 2021-09-13 DIAGNOSIS — R19.7 DIARRHEA, UNSPECIFIED TYPE: ICD-10-CM

## 2021-09-13 DIAGNOSIS — R11.2 NON-INTRACTABLE VOMITING WITH NAUSEA, UNSPECIFIED VOMITING TYPE: ICD-10-CM

## 2021-09-13 LAB
ALBUMIN SERPL-MCNC: 3.5 G/DL (ref 3.5–5)
ALBUMIN/GLOB SERPL: 1 {RATIO} (ref 1.1–2.2)
ALP SERPL-CCNC: 130 U/L (ref 45–117)
ALT SERPL-CCNC: 34 U/L (ref 12–78)
ANION GAP SERPL CALC-SCNC: 13 MMOL/L (ref 5–15)
AST SERPL-CCNC: 33 U/L (ref 15–37)
BASOPHILS # BLD: 0 K/UL (ref 0–0.1)
BASOPHILS NFR BLD: 1 % (ref 0–1)
BILIRUB SERPL-MCNC: 0.4 MG/DL (ref 0.2–1)
BUN SERPL-MCNC: 7 MG/DL (ref 6–20)
BUN/CREAT SERPL: 10 (ref 12–20)
CALCIUM SERPL-MCNC: 8.7 MG/DL (ref 8.5–10.1)
CAMPYLOBACTER SPECIES, DNA: NEGATIVE
CHLORIDE SERPL-SCNC: 104 MMOL/L (ref 97–108)
CO2 SERPL-SCNC: 26 MMOL/L (ref 21–32)
COMMENT, HOLDF: NORMAL
COVID-19 RAPID TEST, COVR: ABNORMAL
CREAT SERPL-MCNC: 0.68 MG/DL (ref 0.55–1.02)
DIFFERENTIAL METHOD BLD: ABNORMAL
ENTEROTOXIGEN E COLI, DNA: NEGATIVE
EOSINOPHIL # BLD: 0.2 K/UL (ref 0–0.4)
EOSINOPHIL NFR BLD: 4 % (ref 0–7)
ERYTHROCYTE [DISTWIDTH] IN BLOOD BY AUTOMATED COUNT: 13.2 % (ref 11.5–14.5)
GLOBULIN SER CALC-MCNC: 3.5 G/DL (ref 2–4)
GLUCOSE SERPL-MCNC: 99 MG/DL (ref 65–100)
HCT VFR BLD AUTO: 40.2 % (ref 35–47)
HGB BLD-MCNC: 12.2 G/DL (ref 11.5–16)
IMM GRANULOCYTES # BLD AUTO: 0 K/UL (ref 0–0.04)
IMM GRANULOCYTES NFR BLD AUTO: 0 % (ref 0–0.5)
LACTATE SERPL-SCNC: 0.8 MMOL/L (ref 0.4–2)
LIPASE SERPL-CCNC: 67 U/L (ref 73–393)
LYMPHOCYTES # BLD: 1.2 K/UL (ref 0.8–3.5)
LYMPHOCYTES NFR BLD: 23 % (ref 12–49)
MCH RBC QN AUTO: 25.3 PG (ref 26–34)
MCHC RBC AUTO-ENTMCNC: 30.3 G/DL (ref 30–36.5)
MCV RBC AUTO: 83.2 FL (ref 80–99)
MONOCYTES # BLD: 0.5 K/UL (ref 0–1)
MONOCYTES NFR BLD: 11 % (ref 5–13)
NEUTS SEG # BLD: 3.1 K/UL (ref 1.8–8)
NEUTS SEG NFR BLD: 61 % (ref 32–75)
NRBC # BLD: 0 K/UL (ref 0–0.01)
NRBC BLD-RTO: 0 PER 100 WBC
P SHIGELLOIDES DNA STL QL NAA+PROBE: NEGATIVE
PLATELET # BLD AUTO: 278 K/UL (ref 150–400)
PMV BLD AUTO: 8.3 FL (ref 8.9–12.9)
POTASSIUM SERPL-SCNC: 3.7 MMOL/L (ref 3.5–5.1)
PROT SERPL-MCNC: 7 G/DL (ref 6.4–8.2)
RBC # BLD AUTO: 4.83 M/UL (ref 3.8–5.2)
SALMONELLA SPECIES, DNA: NEGATIVE
SAMPLES BEING HELD,HOLD: NORMAL
SARS-COV-2, COV2: NORMAL
SHIGA TOXIN PRODUCING, DNA: NEGATIVE
SHIGELLA SP+EIEC IPAH STL QL NAA+PROBE: NEGATIVE
SODIUM SERPL-SCNC: 143 MMOL/L (ref 136–145)
SOURCE, COVRS: ABNORMAL
TROPONIN I SERPL-MCNC: <0.05 NG/ML
VIBRIO SPECIES, DNA: NEGATIVE
WBC # BLD AUTO: 5.1 K/UL (ref 3.6–11)
Y. ENTEROCOLITICA, DNA: NEGATIVE

## 2021-09-13 PROCEDURE — 74011250637 HC RX REV CODE- 250/637: Performed by: EMERGENCY MEDICINE

## 2021-09-13 PROCEDURE — 74011250636 HC RX REV CODE- 250/636: Performed by: EMERGENCY MEDICINE

## 2021-09-13 PROCEDURE — 93005 ELECTROCARDIOGRAM TRACING: CPT

## 2021-09-13 PROCEDURE — 83605 ASSAY OF LACTIC ACID: CPT

## 2021-09-13 PROCEDURE — 99285 EMERGENCY DEPT VISIT HI MDM: CPT

## 2021-09-13 PROCEDURE — 84484 ASSAY OF TROPONIN QUANT: CPT

## 2021-09-13 PROCEDURE — 96375 TX/PRO/DX INJ NEW DRUG ADDON: CPT

## 2021-09-13 PROCEDURE — 74011000258 HC RX REV CODE- 258: Performed by: EMERGENCY MEDICINE

## 2021-09-13 PROCEDURE — 96366 THER/PROPH/DIAG IV INF ADDON: CPT

## 2021-09-13 PROCEDURE — 74011000250 HC RX REV CODE- 250: Performed by: EMERGENCY MEDICINE

## 2021-09-13 PROCEDURE — 83690 ASSAY OF LIPASE: CPT

## 2021-09-13 PROCEDURE — 96365 THER/PROPH/DIAG IV INF INIT: CPT

## 2021-09-13 PROCEDURE — 71045 X-RAY EXAM CHEST 1 VIEW: CPT

## 2021-09-13 PROCEDURE — 87506 IADNA-DNA/RNA PROBE TQ 6-11: CPT

## 2021-09-13 PROCEDURE — 87635 SARS-COV-2 COVID-19 AMP PRB: CPT

## 2021-09-13 PROCEDURE — 85025 COMPLETE CBC W/AUTO DIFF WBC: CPT

## 2021-09-13 PROCEDURE — U0003 INFECTIOUS AGENT DETECTION BY NUCLEIC ACID (DNA OR RNA); SEVERE ACUTE RESPIRATORY SYNDROME CORONAVIRUS 2 (SARS-COV-2) (CORONAVIRUS DISEASE [COVID-19]), AMPLIFIED PROBE TECHNIQUE, MAKING USE OF HIGH THROUGHPUT TECHNOLOGIES AS DESCRIBED BY CMS-2020-01-R: HCPCS

## 2021-09-13 PROCEDURE — 80053 COMPREHEN METABOLIC PANEL: CPT

## 2021-09-13 RX ORDER — KETOROLAC TROMETHAMINE 30 MG/ML
15 INJECTION, SOLUTION INTRAMUSCULAR; INTRAVENOUS
Status: COMPLETED | OUTPATIENT
Start: 2021-09-13 | End: 2021-09-13

## 2021-09-13 RX ORDER — PROMETHAZINE HYDROCHLORIDE 25 MG/1
25 TABLET ORAL
Qty: 12 TABLET | Refills: 0 | Status: SHIPPED | OUTPATIENT
Start: 2021-09-13

## 2021-09-13 RX ORDER — LORAZEPAM 2 MG/ML
1 INJECTION INTRAMUSCULAR
Status: COMPLETED | OUTPATIENT
Start: 2021-09-13 | End: 2021-09-13

## 2021-09-13 RX ORDER — DIPHENHYDRAMINE HYDROCHLORIDE 50 MG/ML
50 INJECTION, SOLUTION INTRAMUSCULAR; INTRAVENOUS
Status: COMPLETED | OUTPATIENT
Start: 2021-09-13 | End: 2021-09-13

## 2021-09-13 RX ORDER — LOPERAMIDE HYDROCHLORIDE 2 MG/1
4 CAPSULE ORAL
Status: COMPLETED | OUTPATIENT
Start: 2021-09-13 | End: 2021-09-13

## 2021-09-13 RX ORDER — PROCHLORPERAZINE EDISYLATE 5 MG/ML
10 INJECTION INTRAMUSCULAR; INTRAVENOUS
Status: DISCONTINUED | OUTPATIENT
Start: 2021-09-13 | End: 2021-09-13 | Stop reason: SDUPTHER

## 2021-09-13 RX ADMIN — KETOROLAC TROMETHAMINE 15 MG: 30 INJECTION, SOLUTION INTRAMUSCULAR; INTRAVENOUS at 15:05

## 2021-09-13 RX ADMIN — SODIUM CHLORIDE 25 MG: 900 INJECTION, SOLUTION INTRAVENOUS at 12:00

## 2021-09-13 RX ADMIN — LORAZEPAM 1 MG: 2 INJECTION INTRAMUSCULAR; INTRAVENOUS at 15:05

## 2021-09-13 RX ADMIN — SODIUM CHLORIDE 1000 ML: 9 INJECTION, SOLUTION INTRAVENOUS at 11:34

## 2021-09-13 RX ADMIN — SODIUM CHLORIDE 1000 ML: 9 INJECTION, SOLUTION INTRAVENOUS at 14:07

## 2021-09-13 RX ADMIN — PROCHLORPERAZINE EDISYLATE 10 MG: 5 INJECTION INTRAMUSCULAR; INTRAVENOUS at 15:05

## 2021-09-13 RX ADMIN — SODIUM CHLORIDE 1000 ML: 9 INJECTION, SOLUTION INTRAVENOUS at 12:46

## 2021-09-13 RX ADMIN — LOPERAMIDE HYDROCHLORIDE 4 MG: 2 CAPSULE ORAL at 12:47

## 2021-09-13 RX ADMIN — DIPHENHYDRAMINE HYDROCHLORIDE 50 MG: 50 INJECTION, SOLUTION INTRAMUSCULAR; INTRAVENOUS at 15:05

## 2021-09-13 NOTE — ED NOTES
Pt ambulatory out of ED with discharge instructions and prescriptions in hand given by Dr. Aydee Palm; pt verbalized understanding of discharge paperwork and time allotted for questions. VSS. Pt alert and oriented. Pt to be picked up from ER waiting room by friend.

## 2021-09-13 NOTE — ED TRIAGE NOTES
Triage: pt received 2nd dose of [address] vaccination and since then has developed constant diarrhea, dry heaves, dizziness, recurrent fall, lethargy and chills. Denies cough, chest pain, rectal bleeding, urinary symptoms or SOB.

## 2021-09-13 NOTE — ED PROVIDER NOTES
41-year-old female with a history of asthma, bronchitis, fibromyalgia, migraines had her second Covid shot around the fifth of this month and says she has had a multitude of symptoms since then. She says there is some overlap with her fibromyalgia symptoms and she is not clear whether this triggered her fibromyalgia or if this is all vaccine related. She has had fever, headaches that have gone beyond just a regular headache center now going into migraine territory, nausea and vomiting, diarrhea, chest pain like an elephant sitting on her chest, occasional shortness of breath, dizziness, fatigue, and decreased appetite. Past Medical History:   Diagnosis Date    Asthma     Bronchitis     Fibromyalgia     Migraines     Seasonal allergic reaction        Past Surgical History:   Procedure Laterality Date    HX APPENDECTOMY      HX GASTRIC BYPASS      HX GYN      HX ORTHOPAEDIC      lumbar fusion    HX TUBAL LIGATION           History reviewed. No pertinent family history. Social History     Socioeconomic History    Marital status: SINGLE     Spouse name: Not on file    Number of children: Not on file    Years of education: Not on file    Highest education level: Not on file   Occupational History    Not on file   Tobacco Use    Smoking status: Former Smoker    Smokeless tobacco: Never Used   Substance and Sexual Activity    Alcohol use: Yes     Comment: rarely    Drug use: Yes     Types: Prescription    Sexual activity: Not on file   Other Topics Concern    Not on file   Social History Narrative    Not on file     Social Determinants of Health     Financial Resource Strain:     Difficulty of Paying Living Expenses:    Food Insecurity:     Worried About Running Out of Food in the Last Year:     [address] in the Last Year:    Transportation Needs:     Lack of Transportation (Medical):      Lack of Transportation (Non-Medical):    Physical Activity:     Days of Exercise per Week:     Minutes of Exercise per Session:    Stress:     Feeling of Stress :    Social Connections:     Frequency of Communication with Friends and Family:     Frequency of Social Gatherings with Friends and Family:     Attends Sikhism Services:     Active Member of Clubs or Organizations:     Attends Club or Organization Meetings:     Marital Status:    Intimate Partner Violence:     Fear of Current or Ex-Partner:     Emotionally Abused:     Physically Abused:     Sexually Abused: ALLERGIES: Lortab [hydrocodone-acetaminophen], Nsaids (non-steroidal anti-inflammatory drug), and Percocet [oxycodone-acetaminophen]    Review of Systems   Constitutional: Positive for activity change, appetite change, chills, fatigue and fever. HENT: Positive for congestion and sore throat. Negative for trouble swallowing. Eyes: Negative for visual disturbance. Respiratory: Positive for cough and shortness of breath. Cardiovascular: Positive for chest pain. Gastrointestinal: Positive for abdominal pain, diarrhea, nausea and vomiting. Genitourinary: Negative for difficulty urinating. Musculoskeletal: Positive for myalgias. Negative for gait problem. Skin: Negative for rash. Neurological: Positive for dizziness, weakness and headaches. Hematological: Bruises/bleeds easily. Psychiatric/Behavioral: Negative for sleep disturbance. Vitals:    09/13/21 1245 09/13/21 1315 09/13/21 1330 09/13/21 1345   BP:       Pulse:       Resp:       Temp:       SpO2: 100% 92% 100% 96%   Weight:                Physical Exam  Constitutional:       Appearance: Normal appearance. HENT:      Head: Normocephalic. Nose: Nose normal.      Mouth/Throat:      Mouth: Mucous membranes are moist.   Eyes:      Extraocular Movements: Extraocular movements intact. Conjunctiva/sclera: Conjunctivae normal.   Cardiovascular:      Rate and Rhythm: Normal rate.    Pulmonary:      Effort: Pulmonary effort is normal. No respiratory distress. Abdominal:      General: Abdomen is flat. Musculoskeletal:         General: Normal range of motion. Skin:     Findings: No rash. Neurological:      General: No focal deficit present. Mental Status: She is alert.    Psychiatric:         Behavior: Behavior normal.          MDM  Number of Diagnoses or Management Options  Diagnosis management comments: EKG at 1219  Normal sinus with normal axis at a rate of 85  RI, QRS, and QTc all within normal limits  No ST changes         Procedures          Assessment and plan patient was turned over to Dr. Kota Zee at 7 PM, laboratory studies pending

## 2021-09-14 LAB
ATRIAL RATE: 85 BPM
CALCULATED P AXIS, ECG09: 67 DEGREES
CALCULATED R AXIS, ECG10: 46 DEGREES
CALCULATED T AXIS, ECG11: 3 DEGREES
DIAGNOSIS, 93000: NORMAL
P-R INTERVAL, ECG05: 124 MS
Q-T INTERVAL, ECG07: 382 MS
QRS DURATION, ECG06: 82 MS
QTC CALCULATION (BEZET), ECG08: 454 MS
SARS-COV-2, XPLCVT: NOT DETECTED
SOURCE, COVRS: NORMAL
VENTRICULAR RATE, ECG03: 85 BPM

## 2021-09-20 ENCOUNTER — HOSPITAL ENCOUNTER (EMERGENCY)
Age: 51
Discharge: HOME | End: 2021-09-20
Attending: EMERGENCY MEDICINE
Payer: MEDICARE

## 2021-09-20 ENCOUNTER — APPOINTMENT (OUTPATIENT)
Dept: CT IMAGING | Age: 51
End: 2021-09-20
Attending: EMERGENCY MEDICINE
Payer: MEDICARE

## 2021-09-20 VITALS
WEIGHT: 165.12 LBS | TEMPERATURE: 98.3 F | BODY MASS INDEX: 26.65 KG/M2 | OXYGEN SATURATION: 96 % | DIASTOLIC BLOOD PRESSURE: 61 MMHG | HEART RATE: 95 BPM | RESPIRATION RATE: 18 BRPM | SYSTOLIC BLOOD PRESSURE: 115 MMHG

## 2021-09-20 DIAGNOSIS — K52.9 ENTEROCOLITIS: Primary | ICD-10-CM

## 2021-09-20 LAB
ALBUMIN SERPL-MCNC: 3.3 G/DL (ref 3.5–5)
ALBUMIN/GLOB SERPL: 0.9 {RATIO} (ref 1.1–2.2)
ALP SERPL-CCNC: 127 U/L (ref 45–117)
ALT SERPL-CCNC: 24 U/L (ref 12–78)
AMPHET UR QL SCN: NEGATIVE
ANION GAP SERPL CALC-SCNC: 12 MMOL/L (ref 5–15)
APPEARANCE UR: ABNORMAL
AST SERPL-CCNC: 20 U/L (ref 15–37)
BACTERIA URNS QL MICRO: NEGATIVE /HPF
BARBITURATES UR QL SCN: NEGATIVE
BASOPHILS # BLD: 0.1 K/UL (ref 0–0.1)
BASOPHILS NFR BLD: 1 % (ref 0–1)
BENZODIAZ UR QL: POSITIVE
BILIRUB SERPL-MCNC: 0.5 MG/DL (ref 0.2–1)
BILIRUB UR QL CFM: NEGATIVE
BUN SERPL-MCNC: 9 MG/DL (ref 6–20)
BUN/CREAT SERPL: 11 (ref 12–20)
CALCIUM SERPL-MCNC: 8.7 MG/DL (ref 8.5–10.1)
CANNABINOIDS UR QL SCN: NEGATIVE
CHLORIDE SERPL-SCNC: 103 MMOL/L (ref 97–108)
CO2 SERPL-SCNC: 28 MMOL/L (ref 21–32)
COCAINE UR QL SCN: NEGATIVE
COLOR UR: ABNORMAL
COMMENT, HOLDF: NORMAL
CREAT SERPL-MCNC: 0.81 MG/DL (ref 0.55–1.02)
DIFFERENTIAL METHOD BLD: ABNORMAL
DRUG SCRN COMMENT,DRGCM: ABNORMAL
EOSINOPHIL # BLD: 0.3 K/UL (ref 0–0.4)
EOSINOPHIL NFR BLD: 3 % (ref 0–7)
EPITH CASTS URNS QL MICRO: ABNORMAL /LPF
ERYTHROCYTE [DISTWIDTH] IN BLOOD BY AUTOMATED COUNT: 13.9 % (ref 11.5–14.5)
GLOBULIN SER CALC-MCNC: 3.8 G/DL (ref 2–4)
GLUCOSE SERPL-MCNC: 88 MG/DL (ref 65–100)
GLUCOSE UR STRIP.AUTO-MCNC: NEGATIVE MG/DL
HCT VFR BLD AUTO: 43.5 % (ref 35–47)
HGB BLD-MCNC: 12.9 G/DL (ref 11.5–16)
HGB UR QL STRIP: NEGATIVE
IMM GRANULOCYTES # BLD AUTO: 0 K/UL (ref 0–0.04)
IMM GRANULOCYTES NFR BLD AUTO: 0 % (ref 0–0.5)
KETONES UR QL STRIP.AUTO: ABNORMAL MG/DL
LACTATE SERPL-SCNC: 0.7 MMOL/L (ref 0.4–2)
LEUKOCYTE ESTERASE UR QL STRIP.AUTO: NEGATIVE
LIPASE SERPL-CCNC: 46 U/L (ref 73–393)
LYMPHOCYTES # BLD: 1.2 K/UL (ref 0.8–3.5)
LYMPHOCYTES NFR BLD: 12 % (ref 12–49)
MAGNESIUM SERPL-MCNC: 1.9 MG/DL (ref 1.6–2.4)
MCH RBC QN AUTO: 25 PG (ref 26–34)
MCHC RBC AUTO-ENTMCNC: 29.7 G/DL (ref 30–36.5)
MCV RBC AUTO: 84.5 FL (ref 80–99)
METHADONE UR QL: NEGATIVE
MONOCYTES # BLD: 0.7 K/UL (ref 0–1)
MONOCYTES NFR BLD: 8 % (ref 5–13)
MUCOUS THREADS URNS QL MICRO: ABNORMAL /LPF
NEUTS SEG # BLD: 7.3 K/UL (ref 1.8–8)
NEUTS SEG NFR BLD: 76 % (ref 32–75)
NITRITE UR QL STRIP.AUTO: NEGATIVE
NRBC # BLD: 0 K/UL (ref 0–0.01)
NRBC BLD-RTO: 0 PER 100 WBC
OPIATES UR QL: NEGATIVE
PCP UR QL: NEGATIVE
PH UR STRIP: 6 [PH] (ref 5–8)
PLATELET # BLD AUTO: 394 K/UL (ref 150–400)
PMV BLD AUTO: 8.4 FL (ref 8.9–12.9)
POTASSIUM SERPL-SCNC: 3.4 MMOL/L (ref 3.5–5.1)
PROT SERPL-MCNC: 7.1 G/DL (ref 6.4–8.2)
PROT UR STRIP-MCNC: ABNORMAL MG/DL
RBC # BLD AUTO: 5.15 M/UL (ref 3.8–5.2)
RBC #/AREA URNS HPF: ABNORMAL /HPF (ref 0–5)
SAMPLES BEING HELD,HOLD: NORMAL
SODIUM SERPL-SCNC: 143 MMOL/L (ref 136–145)
SP GR UR REFRACTOMETRY: >1.03 (ref 1–1.03)
TROPONIN I SERPL-MCNC: <0.05 NG/ML
UR CULT HOLD, URHOLD: NORMAL
UROBILINOGEN UR QL STRIP.AUTO: 0.2 EU/DL (ref 0.2–1)
WBC # BLD AUTO: 9.5 K/UL (ref 3.6–11)
WBC URNS QL MICRO: ABNORMAL /HPF (ref 0–4)

## 2021-09-20 PROCEDURE — 83690 ASSAY OF LIPASE: CPT

## 2021-09-20 PROCEDURE — 74011250636 HC RX REV CODE- 250/636: Performed by: EMERGENCY MEDICINE

## 2021-09-20 PROCEDURE — 74011000250 HC RX REV CODE- 250: Performed by: EMERGENCY MEDICINE

## 2021-09-20 PROCEDURE — 81001 URINALYSIS AUTO W/SCOPE: CPT

## 2021-09-20 PROCEDURE — 74177 CT ABD & PELVIS W/CONTRAST: CPT

## 2021-09-20 PROCEDURE — 96375 TX/PRO/DX INJ NEW DRUG ADDON: CPT

## 2021-09-20 PROCEDURE — 96361 HYDRATE IV INFUSION ADD-ON: CPT

## 2021-09-20 PROCEDURE — 83735 ASSAY OF MAGNESIUM: CPT

## 2021-09-20 PROCEDURE — 96374 THER/PROPH/DIAG INJ IV PUSH: CPT

## 2021-09-20 PROCEDURE — C9113 INJ PANTOPRAZOLE SODIUM, VIA: HCPCS | Performed by: EMERGENCY MEDICINE

## 2021-09-20 PROCEDURE — 84484 ASSAY OF TROPONIN QUANT: CPT

## 2021-09-20 PROCEDURE — 80307 DRUG TEST PRSMV CHEM ANLYZR: CPT

## 2021-09-20 PROCEDURE — 80053 COMPREHEN METABOLIC PANEL: CPT

## 2021-09-20 PROCEDURE — 99284 EMERGENCY DEPT VISIT MOD MDM: CPT

## 2021-09-20 PROCEDURE — 85025 COMPLETE CBC W/AUTO DIFF WBC: CPT

## 2021-09-20 PROCEDURE — 83605 ASSAY OF LACTIC ACID: CPT

## 2021-09-20 PROCEDURE — 74011000636 HC RX REV CODE- 636: Performed by: EMERGENCY MEDICINE

## 2021-09-20 RX ORDER — ONDANSETRON 2 MG/ML
4 INJECTION INTRAMUSCULAR; INTRAVENOUS
Status: COMPLETED | OUTPATIENT
Start: 2021-09-20 | End: 2021-09-20

## 2021-09-20 RX ORDER — CIPROFLOXACIN 500 MG/1
500 TABLET ORAL 2 TIMES DAILY
Qty: 10 TABLET | Refills: 0 | Status: SHIPPED | OUTPATIENT
Start: 2021-09-20 | End: 2021-09-25

## 2021-09-20 RX ORDER — METRONIDAZOLE 500 MG/1
500 TABLET ORAL 2 TIMES DAILY
Qty: 20 TABLET | Refills: 0 | Status: SHIPPED | OUTPATIENT
Start: 2021-09-20 | End: 2021-09-30

## 2021-09-20 RX ORDER — ONDANSETRON 4 MG/1
4 TABLET, ORALLY DISINTEGRATING ORAL
Qty: 20 TABLET | Refills: 0 | Status: SHIPPED | OUTPATIENT
Start: 2021-09-20

## 2021-09-20 RX ORDER — DICYCLOMINE HYDROCHLORIDE 20 MG/1
20 TABLET ORAL EVERY 6 HOURS
Qty: 28 TABLET | Refills: 0 | Status: SHIPPED | OUTPATIENT
Start: 2021-09-20 | End: 2021-09-27

## 2021-09-20 RX ADMIN — IOPAMIDOL 100 ML: 755 INJECTION, SOLUTION INTRAVENOUS at 10:14

## 2021-09-20 RX ADMIN — SODIUM CHLORIDE 1000 ML: 9 INJECTION, SOLUTION INTRAVENOUS at 09:56

## 2021-09-20 RX ADMIN — SODIUM CHLORIDE 80 MG: 9 INJECTION INTRAMUSCULAR; INTRAVENOUS; SUBCUTANEOUS at 09:56

## 2021-09-20 RX ADMIN — ONDANSETRON 4 MG: 2 INJECTION INTRAMUSCULAR; INTRAVENOUS at 09:56

## 2021-09-20 NOTE — ED NOTES
Patient  given copy of dc instructions and  script(s). Patient  verbalized understanding of instructions and script (s). Patient alert and oriented and in no acute distress.   Patient discharged home ambulatory with family

## 2021-09-20 NOTE — ED PROVIDER NOTES
The history is provided by the patient. Abdominal Pain   This is a recurrent problem. The current episode started more than 1 week ago. The problem occurs daily. The problem has not changed since onset. The pain is associated with vomiting and previous surgery. The pain is located in the epigastric region. The quality of the pain is aching. The pain is moderate. Associated symptoms include anorexia, diarrhea, nausea, vomiting and headaches. Pertinent negatives include no fever, no belching, no hematochezia, no melena, no constipation, no dysuria, no frequency, no hematuria, no arthralgias, no myalgias, no trauma, no chest pain and no back pain. The pain is worsened by eating. The pain is relieved by nothing. Past workup includes CT scan. Her past medical history is significant for GERD. The patient's surgical history includes gastric bypass. Past Medical History:   Diagnosis Date    Asthma     Bronchitis     Fibromyalgia     Migraines     Seasonal allergic reaction        Past Surgical History:   Procedure Laterality Date    HX APPENDECTOMY      HX GASTRIC BYPASS      HX GYN      HX ORTHOPAEDIC      lumbar fusion    HX TUBAL LIGATION           History reviewed. No pertinent family history.     Social History     Socioeconomic History    Marital status: SINGLE     Spouse name: Not on file    Number of children: Not on file    Years of education: Not on file    Highest education level: Not on file   Occupational History    Not on file   Tobacco Use    Smoking status: Former Smoker    Smokeless tobacco: Never Used   Substance and Sexual Activity    Alcohol use: Yes     Comment: rarely    Drug use: Yes     Types: Prescription    Sexual activity: Not on file   Other Topics Concern    Not on file   Social History Narrative    Not on file     Social Determinants of Health     Financial Resource Strain:     Difficulty of Paying Living Expenses:    Food Insecurity:     Worried About Running Out of Food in the Last Year:    [address] in the Last Year:    Transportation Needs:     Lack of Transportation (Medical):  Lack of Transportation (Non-Medical):    Physical Activity:     Days of Exercise per Week:     Minutes of Exercise per Session:    Stress:     Feeling of Stress :    Social Connections:     Frequency of Communication with Friends and Family:     Frequency of Social Gatherings with Friends and Family:     Attends Muslim Services:     Active Member of Clubs or Organizations:     Attends Club or Organization Meetings:     Marital Status:    Intimate Partner Violence:     Fear of Current or Ex-Partner:     Emotionally Abused:     Physically Abused:     Sexually Abused: ALLERGIES: Nsaids (non-steroidal anti-inflammatory drug)    Review of Systems   Constitutional: Negative for activity change, chills and fever. HENT: Negative for nosebleeds, sore throat, trouble swallowing and voice change. Eyes: Negative for visual disturbance. Respiratory: Negative for shortness of breath. Cardiovascular: Negative for chest pain and palpitations. Gastrointestinal: Positive for abdominal pain, anorexia, diarrhea, nausea and vomiting. Negative for constipation, hematochezia and melena. Genitourinary: Negative for difficulty urinating, dysuria, frequency, hematuria and urgency. Musculoskeletal: Negative for arthralgias, back pain, myalgias, neck pain and neck stiffness. Skin: Negative for color change. Allergic/Immunologic: Negative for immunocompromised state. Neurological: Positive for headaches. Negative for dizziness, seizures, syncope, weakness, light-headedness and numbness. Psychiatric/Behavioral: Negative for behavioral problems, confusion, hallucinations, self-injury and suicidal ideas.        Vitals:    09/20/21 0900   BP: 106/73   Pulse: 95   Resp: 18   Temp: 98.3 °F (36.8 °C)   SpO2: 95%   Weight: 74.9 kg (165 lb 2 oz)            Physical Exam  Vitals and nursing note reviewed. Constitutional:       General: She is not in acute distress. Appearance: She is well-developed. She is not diaphoretic. HENT:      Head: Atraumatic. Neck:      Trachea: No tracheal deviation. Cardiovascular:      Comments: Warm and well perfused  Pulmonary:      Effort: Pulmonary effort is normal. No respiratory distress. Abdominal:      Tenderness: There is abdominal tenderness in the epigastric area. Musculoskeletal:         General: Normal range of motion. Skin:     General: Skin is warm and dry. Neurological:      Mental Status: She is alert. Coordination: Coordination normal.   Psychiatric:         Behavior: Behavior normal.         Thought Content: Thought content normal.         Judgment: Judgment normal.          MDM     This is a 49-year-old female with past medical history, review of systems, physical exam as above, presenting with complaints of ongoing abdominal pain, nausea and vomiting. Patient states symptoms began 15 days ago after receiving her second Covid vaccine. She has had several presentations here to the emergency department for abdominal pain, nausea and vomiting. Lab work without acute finding, CT scan obtained on the eighth remarkable for air-fluid levels suggestive of diarrhea. Patient states she has been using Imodium without relief. She endorses a waxing and waning course since onset, with days where she feels she is improving, then worsening. She endorses surgical history including gastric bypass, endorses a history of GERD, states she takes 120 mg of PPI daily. Physical exam is remarkable for well-appearing middle-aged female, in no acute distress noted to be normotensive, afebrile without tachycardia, satting well on room air. She has a generally benign abdomen with mild epigastric tenderness to palpation. Patient states she feels her advanced fibromyalgia may be complicating her symptoms.   Differential includes dehydration, electrolyte abnormality, new onset gastroparesis. Plan to obtain CMP, CBC, UA, lipase, magnesium, UDS. Will provide antiemetics, fluid bolus and IV PPI. We will consider repeat imaging given her symptoms are ongoing, reassess, and make a disposition. Procedures    Update:  Patient remains in no acute distress, lab work without acute abnormality. Repeat imaging concerning for enterocolitis. Discussed with patient at bedside. Unclear whether this represents acute inflammatory process versus infectious etiology, bacterial versus viral.  Given prolonged symptoms we will treat as bacterial with oral antibiotics, Bentyl, Zofran for nausea and vomiting, encourage primary care and GI follow-up, return precautions given.

## 2021-09-20 NOTE — ED TRIAGE NOTES
Triage: pt received 2nd dose of Moderan 9/5/2021 and afterwards developed N/V/D and abd pain. Pt was seen here 9/13/21 for same but felt better. Pt reports as long as she didn't eat or drink she felt fine but last night tried eating and drinking which started her symptoms back again.

## 2021-12-23 NOTE — ED TRIAGE NOTES
ANTICOAGULATION  MANAGEMENT: Discharge Review    Eileen Donald chart reviewed for anticoagulation continuity of care    Outpatient surgery/procedure on 12/23 for colonoscopy.    Discharge disposition: Home    Results:  Recent labs: (last 7 days)     12/23/21  0646   INR 1.76*     Anticoagulation inpatient management:     not applicable     Anticoagulation discharge instructions:     Warfarin dosing: as directed by [facility]    Bridging: bridging with enoxaparin (Lovenox)   INR goal change: No      Medication changes affecting anticoagulation: No    Additional factors affecting anticoagulation: No    Plan     No adjustment to anticoagulation plan needed    Patient not contacted-- per anticoag visit from 12/14, patient informed [facility] that she will call clinic 4-6 days after resuming warfarin to schedule as she needs to find a ride.    No adjustment to Anticoagulation Calendar was required      Elle Matias RN   Pt states \"I have the worse sinus migraine of my life and I have a history of sinus migraines. \" Pt states the worst of the pain is on the right side above her eye as well as in her eye. Pt states nausea but no vomiting. Pt states light sensitivity.

## 2022-03-18 PROBLEM — T50.901A DRUG OVERDOSE: Status: ACTIVE | Noted: 2020-03-12

## 2022-03-19 PROBLEM — T50.901A OD (OVERDOSE OF DRUG), ACCIDENTAL OR UNINTENTIONAL, INITIAL ENCOUNTER: Status: ACTIVE | Noted: 2020-03-13

## 2022-03-19 PROBLEM — R41.82 ALTERED MENTAL STATUS: Status: ACTIVE | Noted: 2020-03-12

## 2023-05-11 RX ORDER — CLONAZEPAM 0.5 MG/1
TABLET ORAL DAILY
COMMUNITY

## 2023-05-11 RX ORDER — ARIPIPRAZOLE 5 MG/1
10 TABLET ORAL DAILY
COMMUNITY

## 2023-05-11 RX ORDER — ESOMEPRAZOLE MAGNESIUM 40 MG/1
40 CAPSULE, DELAYED RELEASE ORAL DAILY
COMMUNITY

## 2023-05-11 RX ORDER — PROMETHAZINE HYDROCHLORIDE 25 MG/1
TABLET ORAL EVERY 6 HOURS PRN
COMMUNITY
Start: 2021-09-13

## 2023-05-11 RX ORDER — ONDANSETRON 4 MG/1
TABLET, ORALLY DISINTEGRATING ORAL EVERY 8 HOURS PRN
COMMUNITY
Start: 2021-09-20

## 2024-03-16 ENCOUNTER — APPOINTMENT (OUTPATIENT)
Facility: HOSPITAL | Age: 54
End: 2024-03-16
Payer: MEDICARE

## 2024-03-16 ENCOUNTER — HOSPITAL ENCOUNTER (EMERGENCY)
Facility: HOSPITAL | Age: 54
Discharge: LEFT BEFORE DISCHARGE | End: 2024-03-16
Attending: STUDENT IN AN ORGANIZED HEALTH CARE EDUCATION/TRAINING PROGRAM
Payer: MEDICARE

## 2024-03-16 VITALS
HEIGHT: 62 IN | OXYGEN SATURATION: 99 % | HEART RATE: 81 BPM | BODY MASS INDEX: 21.91 KG/M2 | DIASTOLIC BLOOD PRESSURE: 64 MMHG | RESPIRATION RATE: 16 BRPM | WEIGHT: 119.05 LBS | SYSTOLIC BLOOD PRESSURE: 112 MMHG | TEMPERATURE: 97.7 F

## 2024-03-16 DIAGNOSIS — R42 VERTIGO: Primary | ICD-10-CM

## 2024-03-16 DIAGNOSIS — R29.90 STROKE-LIKE SYMPTOMS: ICD-10-CM

## 2024-03-16 DIAGNOSIS — R20.0 LEFT LEG NUMBNESS: ICD-10-CM

## 2024-03-16 DIAGNOSIS — R26.89 BALANCE PROBLEM: ICD-10-CM

## 2024-03-16 LAB
ALBUMIN SERPL-MCNC: 3.4 G/DL (ref 3.5–5)
ALBUMIN/GLOB SERPL: 0.9 (ref 1.1–2.2)
ALP SERPL-CCNC: 128 U/L (ref 45–117)
ALT SERPL-CCNC: 29 U/L (ref 12–78)
ANION GAP SERPL CALC-SCNC: 9 MMOL/L (ref 5–15)
AST SERPL-CCNC: 25 U/L (ref 15–37)
BASOPHILS # BLD: 0.1 K/UL (ref 0–0.1)
BASOPHILS NFR BLD: 1 % (ref 0–1)
BILIRUB SERPL-MCNC: 0.2 MG/DL (ref 0.2–1)
BUN SERPL-MCNC: 7 MG/DL (ref 6–20)
BUN/CREAT SERPL: 9 (ref 12–20)
CALCIUM SERPL-MCNC: 8.7 MG/DL (ref 8.5–10.1)
CHLORIDE SERPL-SCNC: 105 MMOL/L (ref 97–108)
CO2 SERPL-SCNC: 28 MMOL/L (ref 21–32)
CREAT SERPL-MCNC: 0.74 MG/DL (ref 0.55–1.02)
DIFFERENTIAL METHOD BLD: ABNORMAL
EKG ATRIAL RATE: 83 BPM
EKG DIAGNOSIS: NORMAL
EKG P AXIS: 75 DEGREES
EKG P-R INTERVAL: 116 MS
EKG Q-T INTERVAL: 370 MS
EKG QRS DURATION: 98 MS
EKG QTC CALCULATION (BAZETT): 434 MS
EKG R AXIS: 41 DEGREES
EKG T AXIS: 57 DEGREES
EKG VENTRICULAR RATE: 83 BPM
EOSINOPHIL # BLD: 0.2 K/UL (ref 0–0.4)
EOSINOPHIL NFR BLD: 3 % (ref 0–7)
ERYTHROCYTE [DISTWIDTH] IN BLOOD BY AUTOMATED COUNT: 15 % (ref 11.5–14.5)
GLOBULIN SER CALC-MCNC: 3.8 G/DL (ref 2–4)
GLUCOSE SERPL-MCNC: 122 MG/DL (ref 65–100)
HCT VFR BLD AUTO: 37.9 % (ref 35–47)
HGB BLD-MCNC: 11.6 G/DL (ref 11.5–16)
IMM GRANULOCYTES # BLD AUTO: 0 K/UL (ref 0–0.04)
IMM GRANULOCYTES NFR BLD AUTO: 0 % (ref 0–0.5)
LYMPHOCYTES # BLD: 1.6 K/UL (ref 0.8–3.5)
LYMPHOCYTES NFR BLD: 24 % (ref 12–49)
MAGNESIUM SERPL-MCNC: 1.9 MG/DL (ref 1.6–2.4)
MCH RBC QN AUTO: 25.7 PG (ref 26–34)
MCHC RBC AUTO-ENTMCNC: 30.6 G/DL (ref 30–36.5)
MCV RBC AUTO: 83.8 FL (ref 80–99)
MONOCYTES # BLD: 0.5 K/UL (ref 0–1)
MONOCYTES NFR BLD: 8 % (ref 5–13)
NEUTS SEG # BLD: 4.3 K/UL (ref 1.8–8)
NEUTS SEG NFR BLD: 64 % (ref 32–75)
NRBC # BLD: 0 K/UL (ref 0–0.01)
NRBC BLD-RTO: 0 PER 100 WBC
PLATELET # BLD AUTO: 258 K/UL (ref 150–400)
PMV BLD AUTO: 8.3 FL (ref 8.9–12.9)
POTASSIUM SERPL-SCNC: 4 MMOL/L (ref 3.5–5.1)
PROT SERPL-MCNC: 7.2 G/DL (ref 6.4–8.2)
RBC # BLD AUTO: 4.52 M/UL (ref 3.8–5.2)
SODIUM SERPL-SCNC: 142 MMOL/L (ref 136–145)
TROPONIN I SERPL HS-MCNC: <4 NG/L (ref 0–51)
TSH SERPL DL<=0.05 MIU/L-ACNC: 0.4 UIU/ML (ref 0.36–3.74)
WBC # BLD AUTO: 6.7 K/UL (ref 3.6–11)

## 2024-03-16 PROCEDURE — 6370000000 HC RX 637 (ALT 250 FOR IP): Performed by: STUDENT IN AN ORGANIZED HEALTH CARE EDUCATION/TRAINING PROGRAM

## 2024-03-16 PROCEDURE — 85025 COMPLETE CBC W/AUTO DIFF WBC: CPT

## 2024-03-16 PROCEDURE — 80053 COMPREHEN METABOLIC PANEL: CPT

## 2024-03-16 PROCEDURE — 84484 ASSAY OF TROPONIN QUANT: CPT

## 2024-03-16 PROCEDURE — 93010 ELECTROCARDIOGRAM REPORT: CPT | Performed by: INTERNAL MEDICINE

## 2024-03-16 PROCEDURE — 36415 COLL VENOUS BLD VENIPUNCTURE: CPT

## 2024-03-16 PROCEDURE — 83735 ASSAY OF MAGNESIUM: CPT

## 2024-03-16 PROCEDURE — 93005 ELECTROCARDIOGRAM TRACING: CPT | Performed by: STUDENT IN AN ORGANIZED HEALTH CARE EDUCATION/TRAINING PROGRAM

## 2024-03-16 PROCEDURE — 4500000002 HC ER NO CHARGE

## 2024-03-16 PROCEDURE — 84443 ASSAY THYROID STIM HORMONE: CPT

## 2024-03-16 RX ORDER — 0.9 % SODIUM CHLORIDE 0.9 %
1000 INTRAVENOUS SOLUTION INTRAVENOUS ONCE
Status: DISCONTINUED | OUTPATIENT
Start: 2024-03-16 | End: 2024-03-16 | Stop reason: HOSPADM

## 2024-03-16 RX ORDER — HYDROXYZINE HYDROCHLORIDE 25 MG/1
25 TABLET, FILM COATED ORAL
Status: COMPLETED | OUTPATIENT
Start: 2024-03-16 | End: 2024-03-16

## 2024-03-16 RX ADMIN — HYDROXYZINE HYDROCHLORIDE 25 MG: 25 TABLET, FILM COATED ORAL at 12:54

## 2024-03-16 RX ADMIN — Medication 1000 ML: at 12:55

## 2024-03-16 ASSESSMENT — PAIN SCALES - GENERAL: PAINLEVEL_OUTOF10: 0

## 2024-03-16 ASSESSMENT — PAIN - FUNCTIONAL ASSESSMENT: PAIN_FUNCTIONAL_ASSESSMENT: 0-10

## 2024-03-16 NOTE — ED PROVIDER NOTES
[facility]  EMERGENCY DEPARTMENT ENCOUNTER      Pt Name: Naty Perez  MRN: [id number]  Birthdate 1970  Date of evaluation: 3/16/2024  Provider: Patrick Miranda DO    CHIEF COMPLAINT       Chief Complaint   Patient presents with    Anxiety    Dizziness         HISTORY OF PRESENT ILLNESS   (Location/Symptom, Timing/Onset, Context/Setting, Quality, Duration, Modifying Factors, Severity)  Note limiting factors.   HPI      Review of External Medical Records:     Nursing Notes were reviewed.    REVIEW OF SYSTEMS    (2-9 systems for level 4, 10 or more for level 5)     Review of Systems    Except as noted above the remainder of the review of systems was reviewed and negative.       PAST MEDICAL HISTORY     Past Medical History:   Diagnosis Date    Asthma     Bronchitis     Fibromyalgia     Migraines     Seasonal allergic reaction          SURGICAL HISTORY       Past Surgical History:   Procedure Laterality Date    APPENDECTOMY      GASTRIC BYPASS      GYN      ORTHOPEDIC SURGERY      lumbar fusion    TUBAL LIGATION           CURRENT MEDICATIONS       Previous Medications    ARIPIPRAZOLE (ABILIFY) 5 MG TABLET    Take 2 tablets by mouth daily    CLONAZEPAM (KLONOPIN) 0.5 MG TABLET    Take by mouth daily.    ESOMEPRAZOLE (NEXIUM) 40 MG DELAYED RELEASE CAPSULE    Take 1 capsule by mouth daily    ONDANSETRON (ZOFRAN-ODT) 4 MG DISINTEGRATING TABLET    Take by mouth every 8 hours as needed    PROMETHAZINE (PHENERGAN) 25 MG TABLET    Take by mouth every 6 hours as needed       ALLERGIES     Nsaids    FAMILY HISTORY     No family history on file.       SOCIAL HISTORY       Social History     Socioeconomic History    Marital status: Single   Tobacco Use    Smoking status: Former    Smokeless tobacco: Never   Substance and Sexual Activity    Alcohol use: Yes    Drug use: Yes     Types: Prescription           PHYSICAL EXAM    (up to 7 for level 4, 8 or more for level 5)     ED Triage Vitals [03/16/24 1221]  occasionally words are mis-transcribed.)    Patrick Miranda DO (electronically signed)  Emergency Attending Physician / Physician Assistant / Nurse Practitioner

## 2024-03-16 NOTE — ED NOTES
Patient hit call bell requesting this RN. This RN went to patient's room. Patient expressed she needed to go home to see her dog. Patient reported if she did not get her CT in the next ten minutes she was leaving, DO made aware. Patient requested IV to be removed. This RN removed IV and applied dressing. This RN asked if her dog was ok, patient answered \"It's my business.\" Patient stood up and took monitoring off and began to get dressed. Patient informed the attending physician should be in shortly. Patient stated \" He is not going to make me stay. I am leaving.\"  Attending physician went to see patient. Patient still reports she is going to leave. Patient eloped ambulatory and pov.

## 2024-03-16 NOTE — ED TRIAGE NOTES
Pt arrives today POV stating that her fiance just passed and she has been feeling dizzy, light headed, unable to sleep, nausea. Pt denies CP, sob, heart history. Concerned about her anemia and fibro myalgia.

## 2024-03-29 ASSESSMENT — ENCOUNTER SYMPTOMS
ABDOMINAL PAIN: 0
SHORTNESS OF BREATH: 0

## 2024-05-23 ENCOUNTER — APPOINTMENT (OUTPATIENT)
Facility: HOSPITAL | Age: 54
End: 2024-05-23
Payer: MEDICARE

## 2024-05-23 ENCOUNTER — HOSPITAL ENCOUNTER (EMERGENCY)
Facility: HOSPITAL | Age: 54
Discharge: HOME | End: 2024-05-23
Attending: EMERGENCY MEDICINE
Payer: MEDICARE
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
## 2024-05-23 DIAGNOSIS — H92.01 RIGHT EAR PAIN: ICD-10-CM

## 2024-05-23 PROCEDURE — 90471 IMMUNIZATION ADMIN: CPT | Performed by: EMERGENCY MEDICINE

## 2024-05-23 PROCEDURE — 90714 TD VACC NO PRESV 7 YRS+ IM: CPT | Performed by: EMERGENCY MEDICINE

## 2024-05-23 PROCEDURE — 6360000002 HC RX W HCPCS: Performed by: EMERGENCY MEDICINE

## 2024-05-23 PROCEDURE — 99284 EMERGENCY DEPT VISIT MOD MDM: CPT

## 2024-05-23 PROCEDURE — 6370000000 HC RX 637 (ALT 250 FOR IP): Performed by: EMERGENCY MEDICINE

## 2024-05-23 PROCEDURE — 73130 X-RAY EXAM OF HAND: CPT

## 2024-05-23 RX ORDER — TRAMADOL HYDROCHLORIDE 50 MG/1
50 TABLET ORAL
Status: COMPLETED | OUTPATIENT
Start: 2024-05-23 | End: 2024-05-23

## 2024-05-23 RX ORDER — OMEPRAZOLE 20 MG/1
40 CAPSULE, DELAYED RELEASE ORAL DAILY
COMMUNITY

## 2024-05-23 RX ORDER — AMOXICILLIN AND CLAVULANATE POTASSIUM 875; 125 MG/1; MG/1
1 TABLET, FILM COATED ORAL 2 TIMES DAILY
Qty: 10 TABLET | Refills: 0 | Status: SHIPPED | OUTPATIENT
Start: 2024-05-23 | End: 2024-05-28

## 2024-05-23 RX ORDER — ROPINIROLE 0.5 MG/1
0.5 TABLET, FILM COATED ORAL 3 TIMES DAILY PRN
COMMUNITY

## 2024-05-23 RX ADMIN — TRAMADOL HYDROCHLORIDE 50 MG: 50 TABLET, COATED ORAL at 08:46

## 2024-05-23 RX ADMIN — CLOSTRIDIUM TETANI TOXOID ANTIGEN (FORMALDEHYDE INACTIVATED) AND CORYNEBACTERIUM DIPHTHERIAE TOXOID ANTIGEN (FORMALDEHYDE INACTIVATED) 0.5 ML: 5; 2 INJECTION, SUSPENSION INTRAMUSCULAR at 08:45

## 2024-05-23 ASSESSMENT — PAIN DESCRIPTION - ORIENTATION: ORIENTATION: RIGHT

## 2024-05-23 ASSESSMENT — PAIN DESCRIPTION - LOCATION: LOCATION: HAND;EAR

## 2024-05-23 ASSESSMENT — PAIN DESCRIPTION - DESCRIPTORS: DESCRIPTORS: ACHING

## 2024-05-23 ASSESSMENT — PAIN SCALES - GENERAL
PAINLEVEL_OUTOF10: 0
PAINLEVEL_OUTOF10: 10

## 2024-05-23 NOTE — ED PROVIDER NOTES
education: None    Highest education level: None   Tobacco Use    Smoking status: Former    Smokeless tobacco: Never   Substance and Sexual Activity    Alcohol use: Yes    Drug use: Yes     Types: Prescription         PHYSICAL EXAM       ED Triage Vitals [05/23/24 0811]   BP Temp Temp Source Pulse Respirations SpO2 Height Weight - Scale   98/75 98.3 °F (36.8 °C) Oral 94 14 99 % 1.575 m (5' 2\") 54.1 kg (119 lb 4.3 oz)       Body mass index is 21.81 kg/m².    Physical Exam  Constitutional:       Comments: Sitting upright no acute distress   HENT:      Right Ear: Tympanic membrane, ear canal and external ear normal. There is no impacted cerumen.      Left Ear: Tympanic membrane, ear canal and external ear normal. There is no impacted cerumen.   Cardiovascular:      Rate and Rhythm: Normal rate and regular rhythm.      Comments: 2+ right radial pulse and normal capillary refill of the fingers of the right hand  Pulmonary:      Effort: Pulmonary effort is normal. No respiratory distress.   Musculoskeletal:      Comments: Scattered ecchymosis on the dorsum and ventral aspects of the right hand.  Minimal soft tissue swelling.  Superficial laceration on the pad of the right first finger distal to the DIP joint with no active bleeding   Skin:     General: Skin is warm and dry.   Neurological:      Comments: Awake and alert.  GCS 15.  Sensation normal throughout the right hand             EMERGENCY DEPARTMENT COURSE and DIFFERENTIAL DIAGNOSIS/MDM:   Vitals:    Vitals:    05/23/24 0811   BP: 98/75   Pulse: 94   Resp: 14   Temp: 98.3 °F (36.8 °C)   TempSrc: Oral   SpO2: 99%   Weight: 54.1 kg (119 lb 4.3 oz)   Height: 1.575 m (5' 2\")         Medical Decision Making  Amount and/or Complexity of Data Reviewed  Radiology: ordered.    Risk  Prescription drug management.      Patient presenting with the above chief complaint.  Vitals are stable.  Ecchymosis on the ventral and dorsum of the right hand.  There is a small wound on the

## 2025-01-23 ENCOUNTER — APPOINTMENT (OUTPATIENT)
Facility: HOSPITAL | Age: 55
End: 2025-01-23
Payer: MEDICARE

## 2025-01-23 ENCOUNTER — HOSPITAL ENCOUNTER (EMERGENCY)
Facility: HOSPITAL | Age: 55
Discharge: HOME | End: 2025-01-23
Attending: EMERGENCY MEDICINE
Payer: MEDICARE

## 2025-01-23 VITALS
SYSTOLIC BLOOD PRESSURE: 119 MMHG | HEART RATE: 77 BPM | DIASTOLIC BLOOD PRESSURE: 69 MMHG | RESPIRATION RATE: 18 BRPM | WEIGHT: 151.24 LBS | OXYGEN SATURATION: 96 % | TEMPERATURE: 97.9 F | BODY MASS INDEX: 27.66 KG/M2

## 2025-01-23 DIAGNOSIS — J01.90 ACUTE SINUSITIS, RECURRENCE NOT SPECIFIED, UNSPECIFIED LOCATION: ICD-10-CM

## 2025-01-23 DIAGNOSIS — R06.2 WHEEZING: ICD-10-CM

## 2025-01-23 DIAGNOSIS — R51.9 FACIAL PAIN: Primary | ICD-10-CM

## 2025-01-23 LAB
FLUAV RNA SPEC QL NAA+PROBE: NOT DETECTED
FLUBV RNA SPEC QL NAA+PROBE: NOT DETECTED
SARS-COV-2 RNA RESP QL NAA+PROBE: NOT DETECTED
SOURCE: NORMAL

## 2025-01-23 PROCEDURE — 87636 SARSCOV2 & INF A&B AMP PRB: CPT

## 2025-01-23 PROCEDURE — 6360000002 HC RX W HCPCS: Performed by: EMERGENCY MEDICINE

## 2025-01-23 PROCEDURE — 99284 EMERGENCY DEPT VISIT MOD MDM: CPT

## 2025-01-23 PROCEDURE — 71046 X-RAY EXAM CHEST 2 VIEWS: CPT

## 2025-01-23 PROCEDURE — 94640 AIRWAY INHALATION TREATMENT: CPT

## 2025-01-23 PROCEDURE — 6370000000 HC RX 637 (ALT 250 FOR IP): Performed by: EMERGENCY MEDICINE

## 2025-01-23 RX ORDER — INHALER, ASSIST DEVICES
1 SPACER (EA) MISCELLANEOUS PRN
Qty: 1 EACH | Refills: 0 | Status: SHIPPED | OUTPATIENT
Start: 2025-01-23

## 2025-01-23 RX ORDER — ACETAMINOPHEN 325 MG/1
650 TABLET ORAL
Status: COMPLETED | OUTPATIENT
Start: 2025-01-23 | End: 2025-01-23

## 2025-01-23 RX ADMIN — ACETAMINOPHEN 650 MG: 325 TABLET ORAL at 10:58

## 2025-01-23 RX ADMIN — ALBUTEROL SULFATE 1 DOSE: 2.5 SOLUTION RESPIRATORY (INHALATION) at 11:13

## 2025-01-23 ASSESSMENT — PAIN SCALES - GENERAL
PAINLEVEL_OUTOF10: 4
PAINLEVEL_OUTOF10: 10

## 2025-01-23 ASSESSMENT — PAIN DESCRIPTION - LOCATION
LOCATION: FACE
LOCATION: FACE

## 2025-01-23 NOTE — ED TRIAGE NOTES
Pain in the head and face, patient thinks it is from congestion, patient states she feels like she has a lot stuff in her lungs, no coughing/sneezing    Has been going on for a month

## 2025-01-23 NOTE — ED PROVIDER NOTES
[facility]  EMERGENCY DEPARTMENT ENCOUNTER      Pt Name: Naty Perez  MRN: [id number]  Birthdate 1970  Date of evaluation: 1/23/2025  Provider: Nghia Martinez MD    CHIEF COMPLAINT       Chief Complaint   Patient presents with    Cold Symptoms         HISTORY OF PRESENT ILLNESS   (Location/Symptom, Timing/Onset, Context/Setting, Quality, Duration, Modifying Factors, Severity)  Note limiting factors.   HPI  Naty Perez is a 54-year-old female with a past medical history significant for asthma, bronchitis, fibromyalgia, migraines, seasonal allergic reactions, and a history of vertigo. She presented to the Emergency Department providing her own history. She reports experiencing cold symptoms with head and face pain, likely due to congestion, for the past month. Her symptoms include a sensation of significant congestion in her lungs, facial and sinus swelling sensation, and ear crackling in right ear. She denies coughing or sneezing. A fever of approximately 101°F was reported yesterday and no fever tday. She has been taking decongestants for a month, including Mucinex, which has been ineffective, and Tylenol, which she took at 4:00 PM. She cannot take NSAIDs and avoids Benadryl due to restlessness.       Review of External Medical Records:     Nursing Notes were reviewed.    REVIEW OF SYSTEMS    (2-9 systems for level 4, 10 or more for level 5)     Review of Systems    Except as noted above the remainder of the review of systems was reviewed and negative.       PAST MEDICAL HISTORY     Past Medical History:   Diagnosis Date    Asthma     Bronchitis     Fibromyalgia     Migraines     Seasonal allergic reaction          SURGICAL HISTORY       Past Surgical History:   Procedure Laterality Date    APPENDECTOMY      GASTRIC BYPASS SURGERY      GYN      ORTHOPEDIC SURGERY      lumbar fusion    TUBAL LIGATION           CURRENT MEDICATIONS       Previous Medications     no abrasion/no back pain/no bruising/no deformity/no difficulty bearing weight/no fever/no numbness/no stiffness/no tingling/no weakness

## 2025-01-24 ENCOUNTER — HOSPITAL ENCOUNTER (EMERGENCY)
Facility: HOSPITAL | Age: 55
Discharge: HOME | End: 2025-01-24
Attending: EMERGENCY MEDICINE
Payer: MEDICARE

## 2025-01-24 VITALS
HEART RATE: 79 BPM | SYSTOLIC BLOOD PRESSURE: 155 MMHG | OXYGEN SATURATION: 98 % | RESPIRATION RATE: 16 BRPM | TEMPERATURE: 98.7 F | DIASTOLIC BLOOD PRESSURE: 99 MMHG

## 2025-01-24 DIAGNOSIS — J34.89 SINUS PAIN: Primary | ICD-10-CM

## 2025-01-24 PROCEDURE — 6370000000 HC RX 637 (ALT 250 FOR IP)

## 2025-01-24 PROCEDURE — 6360000002 HC RX W HCPCS

## 2025-01-24 PROCEDURE — 99283 EMERGENCY DEPT VISIT LOW MDM: CPT

## 2025-01-24 RX ORDER — METHYLPREDNISOLONE 4 MG/1
TABLET ORAL
Qty: 21 TABLET | Refills: 0 | Status: SHIPPED | OUTPATIENT
Start: 2025-01-24 | End: 2025-01-30

## 2025-01-24 RX ORDER — OXYMETAZOLINE HYDROCHLORIDE 0.05 G/100ML
2 SPRAY NASAL
Status: COMPLETED | OUTPATIENT
Start: 2025-01-24 | End: 2025-01-24

## 2025-01-24 RX ORDER — DEXAMETHASONE 4 MG/1
4 TABLET ORAL
Status: COMPLETED | OUTPATIENT
Start: 2025-01-24 | End: 2025-01-24

## 2025-01-24 RX ADMIN — DEXAMETHASONE 4 MG: 4 TABLET ORAL at 16:27

## 2025-01-24 RX ADMIN — OXYMETAZOLINE HYDROCHLORIDE 2 SPRAY: 0.5 SPRAY NASAL at 16:28

## 2025-01-24 ASSESSMENT — ENCOUNTER SYMPTOMS
SINUS PAIN: 1
SINUS PRESSURE: 1
VOMITING: 0
SHORTNESS OF BREATH: 0

## 2025-01-24 ASSESSMENT — LIFESTYLE VARIABLES
HOW OFTEN DO YOU HAVE A DRINK CONTAINING ALCOHOL: NEVER
HOW MANY STANDARD DRINKS CONTAINING ALCOHOL DO YOU HAVE ON A TYPICAL DAY: PATIENT DOES NOT DRINK

## 2025-01-24 ASSESSMENT — PAIN - FUNCTIONAL ASSESSMENT: PAIN_FUNCTIONAL_ASSESSMENT: 0-10

## 2025-01-24 ASSESSMENT — PAIN SCALES - GENERAL: PAINLEVEL_OUTOF10: 10

## 2025-01-24 ASSESSMENT — PAIN DESCRIPTION - LOCATION: LOCATION: FACE

## 2025-01-24 NOTE — ED TRIAGE NOTES
Patient arrives ambulatory. Reports sinus pressure and pain that started yesterday. She was seen yesterday and started on augmentin.

## 2025-01-24 NOTE — ED PROVIDER NOTES
[facility] EMERGENCY DEPARTMENT  EMERGENCY DEPARTMENT ENCOUNTER      Pt Name: Naty Perez  MRN: [id number]  Birthdate 1970  Date of evaluation: 1/24/2025  Provider: ROMEO Luke    CHIEF COMPLAINT       Chief Complaint   Patient presents with    Facial Pain         HISTORY OF PRESENT ILLNESS   (Location/Symptom, Timing/Onset, Context/Setting, Quality, Duration, Modifying Factors, Severity)  Note limiting factors.   Naty Perez is a 54 y.o. female with history of fibromyalgia, asthma, allergies, migraines who presents to the emergency department complaining of facial pain for the past month.  Patient was seen emergency department yesterday where she was discharged home with Augmentin for suspected sinusitis.  Patient reports taking Tylenol at home without any relief.  She was having 10 out of 10 maxillary sinus pain.              Review of External Medical Records:     Nursing Notes were reviewed.    REVIEW OF SYSTEMS    (2-9 systems for level 4, 10 or more for level 5)     Review of Systems   Constitutional:  Negative for fever.   HENT:  Positive for congestion, sinus pressure and sinus pain.    Respiratory:  Negative for shortness of breath.    Cardiovascular:  Negative for chest pain.   Gastrointestinal:  Negative for vomiting.   Musculoskeletal:  Negative for joint swelling.   Skin:  Negative for wound.   Neurological:  Negative for syncope.   All other systems reviewed and are negative.      Except as noted above the remainder of the review of systems was reviewed and negative.       PAST MEDICAL HISTORY     Past Medical History:   Diagnosis Date    Asthma     Bronchitis     Fibromyalgia     Migraines     Seasonal allergic reaction          SURGICAL HISTORY       Past Surgical History:   Procedure Laterality Date    APPENDECTOMY      GASTRIC BYPASS SURGERY      GYN      ORTHOPEDIC SURGERY      lumbar fusion    TUBAL LIGATION           CURRENT MEDICATIONS       Previous  Medications    AMOXICILLIN-CLAVULANATE (AUGMENTIN) 875-125 MG PER TABLET    Take 1 tablet by mouth 2 times daily for 7 days    ARIPIPRAZOLE (ABILIFY) 5 MG TABLET    Take 2 tablets by mouth daily    OMEPRAZOLE (PRILOSEC) 20 MG DELAYED RELEASE CAPSULE    Take 2 capsules by mouth daily    ROPINIROLE (REQUIP) 0.5 MG TABLET    Take 1 tablet by mouth 3 times daily as needed    SPACER/AERO-HOLDING CHAMBERS (AEROCHAMBER HOLDING CHAMBER) JASON    1 Units by Does not apply route as needed (use of albuteral inhaler)       ALLERGIES     Nsaids    FAMILY HISTORY     History reviewed. No pertinent family history.       SOCIAL HISTORY       Social History     Socioeconomic History    Marital status: Single     Spouse name: None    Number of children: None    Years of education: None    Highest education level: None   Tobacco Use    Smoking status: Former    Smokeless tobacco: Never   Substance and Sexual Activity    Alcohol use: Yes    Drug use: Not Currently     Types: Prescription           PHYSICAL EXAM    (up to 7 for level 4, 8 or more for level 5)     ED Triage Vitals [01/24/25 1558]   BP Systolic BP Percentile Diastolic BP Percentile Temp Temp Source Pulse Respirations SpO2   (!) 155/99 -- -- 98.7 °F (37.1 °C) Oral 79 16 98 %      Height Weight         -- --             There is no height or weight on file to calculate BMI.    Physical Exam  Constitutional:       Appearance: Normal appearance.   HENT:      Head: Normocephalic and atraumatic.      Nose: Congestion present.   Cardiovascular:      Rate and Rhythm: Normal rate and regular rhythm.   Pulmonary:      Effort: Pulmonary effort is normal.      Breath sounds: Normal breath sounds.   Skin:     General: Skin is warm.   Neurological:      General: No focal deficit present.      Mental Status: She is alert.   Psychiatric:         Mood and Affect: Mood normal.         DIAGNOSTIC RESULTS     EKG: All EKG's are interpreted by the Emergency Department Physician who either

## 2025-01-24 NOTE — DISCHARGE INSTRUCTIONS
As discussed today, please continue to take Tylenol at home as needed for pain relief.  Please continue to use over-the-counter nasal sprays to alleviate nasal congestion.  You can also use saline nasal rinses over-the-counter to help alleviate congestion.  Please take Medrol Dosepak as prescribed.  Please follow-up with PCP.